# Patient Record
Sex: MALE | Employment: OTHER | URBAN - METROPOLITAN AREA
[De-identification: names, ages, dates, MRNs, and addresses within clinical notes are randomized per-mention and may not be internally consistent; named-entity substitution may affect disease eponyms.]

---

## 2017-03-01 ENCOUNTER — TELEPHONE (OUTPATIENT)
Dept: ONCOLOGY | Age: 62
End: 2017-03-01

## 2017-03-02 ENCOUNTER — TELEPHONE (OUTPATIENT)
Dept: ONCOLOGY | Age: 62
End: 2017-03-02

## 2017-03-02 NOTE — TELEPHONE ENCOUNTER
Please notify patient that labs and comments from Dr. Alexandru Chambers were released on [facility] on 2/7/17.

## 2017-03-02 NOTE — TELEPHONE ENCOUNTER
Patient is calling back a second time to obtain lab results. States that he has been waiting since 2/5/17 to get a call back with results. He would only like to speak with Dr. Hany Preciado regarding his labs.

## 2017-03-03 NOTE — TELEPHONE ENCOUNTER
Call placed to patient, HIPAA verified, made aware that lab results are available on [facility]. Patient requested printed labs be sent to his home, as he does not have access to [facility] at this time.  Labs placed in mail today per patient request.

## 2017-04-26 ENCOUNTER — HOSPITAL ENCOUNTER (EMERGENCY)
Age: 62
Discharge: HOME | End: 2017-04-26
Attending: EMERGENCY MEDICINE
Payer: MEDICARE

## 2017-04-26 ENCOUNTER — APPOINTMENT (OUTPATIENT)
Dept: GENERAL RADIOLOGY | Age: 62
End: 2017-04-26
Attending: EMERGENCY MEDICINE
Payer: MEDICARE

## 2017-04-26 VITALS
OXYGEN SATURATION: 99 % | TEMPERATURE: 97.9 F | HEART RATE: 82 BPM | DIASTOLIC BLOOD PRESSURE: 89 MMHG | RESPIRATION RATE: 15 BRPM | SYSTOLIC BLOOD PRESSURE: 149 MMHG

## 2017-04-26 DIAGNOSIS — S82.301A CLOSED FRACTURE OF DISTAL END OF RIGHT TIBIA, UNSPECIFIED FRACTURE MORPHOLOGY, INITIAL ENCOUNTER: Primary | ICD-10-CM

## 2017-04-26 PROCEDURE — 77030028224 HC PDNG CST BSNM -A

## 2017-04-26 PROCEDURE — 99284 EMERGENCY DEPT VISIT MOD MDM: CPT

## 2017-04-26 PROCEDURE — 73590 X-RAY EXAM OF LOWER LEG: CPT

## 2017-04-26 PROCEDURE — 73630 X-RAY EXAM OF FOOT: CPT

## 2017-04-26 PROCEDURE — 73610 X-RAY EXAM OF ANKLE: CPT

## 2017-04-26 PROCEDURE — 75810000053 HC SPLINT APPLICATION

## 2017-04-26 RX ORDER — HYDROCODONE BITARTRATE AND ACETAMINOPHEN 5; 325 MG/1; MG/1
1 TABLET ORAL
Qty: 17 TAB | Refills: 0 | Status: SHIPPED | OUTPATIENT
Start: 2017-04-26

## 2017-04-26 NOTE — ED PROVIDER NOTES
HPI Comments: 65-year-old male here with right ankle, foot and lower leg injury after a fall on Friday or 5 days ago. Patient was seen by his primary care physician and referred to the emergency room for further evaluation. Patient states that he was trying to push a spider out of the way with his foot when he slipped and fell while wearing socks on the floor. He has ecchymosis and swelling to his right ankle and foot as well as pain of his proximal right lower leg. He states that he struck around his left eye but denies any loss of consciousness, headache, nausea, vomiting, neck pain, weakness or numbness or any other complaints. He is able to weight-bear on the right ankle/foot but significant pain. Denies any other complaints. Social history: Nonsmoker. No alcohol or drug use. Past Medical History:   Diagnosis Date    Anxiety     Arthritis     Asthma     Burning with urination     Depression     Diabetes (HCC)     GERD (gastroesophageal reflux disease)     Memory loss     Psychiatric diagnosis     Generalized Anxiety Disorder    Psychiatric disorder     Anxiety    Psychiatric disorder     Depression       Past Surgical History:   Procedure Laterality Date    HX GI  4115    Fundoplication          Family History:   Problem Relation Age of Onset    Heart Disease Father     Dementia Father     Dementia Mother     Headache Mother     Migraines Mother     Neuropathy Mother        Social History     Social History    Marital status:      Spouse name: N/A    Number of children: N/A    Years of education: N/A     Occupational History    Not on file. Social History Main Topics    Smoking status: Never Smoker    Smokeless tobacco: Never Used    Alcohol use No      Comment: occasional    Drug use: No    Sexual activity: Not Currently     Other Topics Concern    Not on file     Social History Narrative    Lives with wife.          ALLERGIES: Review of patient's allergies indicates no known allergies. Review of Systems   Constitutional: Negative for fever. Musculoskeletal: Positive for arthralgias and gait problem. Negative for neck pain. All other systems reviewed and are negative. Vitals:    04/26/17 1648   BP: 149/89   Pulse: 99   Resp: 18   Temp: 100.3 °F (37.9 °C)   SpO2: 96%            Physical Exam   Constitutional: He is oriented to person, place, and time. He appears well-developed and well-nourished. HENT:   Head: Normocephalic. Mouth/Throat: Oropharynx is clear and moist. No oropharyngeal exudate. Mild ecchymosis around left eye.  + EOM   Eyes: Conjunctivae are normal. Pupils are equal, round, and reactive to light. Right eye exhibits no discharge. Left eye exhibits no discharge. No scleral icterus. Neck: Normal range of motion. Neck supple. Cardiovascular: Normal rate, regular rhythm and normal heart sounds. Exam reveals no gallop and no friction rub. No murmur heard. Pulmonary/Chest: Effort normal and breath sounds normal. No respiratory distress. He has no wheezes. He has no rales. Abdominal: Soft. Bowel sounds are normal. He exhibits no distension. There is no tenderness. There is no guarding. Musculoskeletal: Normal range of motion. Ecchymosis and tenderness of right ankle maleoli, medial foot, distal and proximal tibia. Distal pms of right leg intact. Lymphadenopathy:     He has no cervical adenopathy. Neurological: He is alert and oriented to person, place, and time. No cranial nerve deficit. He exhibits normal muscle tone. Coordination normal.   Skin: Skin is warm and dry. No rash noted. No pallor. Nursing note and vitals reviewed. [facility]  ED Course   right leg, ankle and foot injury occurring on Friday. NVI intact. Pain score of zero so will not treat pain at this time. Check right tib fib, ankle and foot x-ray.       Procedures      5:58 PM  Explained results of x-rays and treatment plan including need for splint, nonweight bearing, and close f/u with orthopedics. Explained that I will prescribe some pain medication and what kind. Pt questioned time frame to being discharged as \"[facility] even gives estimates for time\" and how long to place a splint. Pt stated he knew how healthcare was and wanted to a know a time frame. I estimated 45 minutes for him and his  after they demanded a more precise estimate to the time they will be leaving.      6:01 PM  Patient's results have been reviewed with them. Patient and/or family have verbally conveyed their understanding and agreement of the patient's signs, symptoms, diagnosis, treatment and prognosis and additionally agree to follow up as recommended or return to the Emergency Room should their condition change prior to follow-up. Discharge instructions have also been provided to the patient with some educational information regarding their diagnosis as well a list of reasons why they would want to return to the ER prior to their follow-up appointment should their condition change. Splint applied by staff and evaluated by Milli Brady MD.  Neurovascular status intact post splint application. Desired position maintained. 6:19 PM  Referred to [facility] at pt's request as pt does not want f/u with ortho [facility] as they do not return phone calls per him. Milli Brady MD          No results found for this or any previous visit (from the past 24 hour(s)). Xr Tib/fib Rt    Result Date: 4/26/2017  XR FOOT RT MIN 3 V, XR TIB/FIB RT, XR ANKLE RT MIN 3 V ,4/26/2017 5:18 PM INDICATION: Swollen right foot and ankle. Patient slid in a soccer while trying to put something off the floor last Friday. COMPARISON: None . FINDINGS: TIBIA and FIBULA: 2 views The proximal tibia and fibula are normal without evidence of fracture. [facility]  ANKLE: 3 views There is a fracture of the distal fibula with minimal posterior displacement of the distal fracture fragment by approximately 0.4 cm. [facility] FOOT: 3 views Visible fracture or malalignment in the foot. Mild degree degenerative change in the midfoot. [facility] IMPRESSION: 1. Fracture of the distal fibula with minimal displacement of the distal fragment posteriorly by approximately 0.4 cm       Xr Ankle Rt Min 3 V    Result Date: 4/26/2017  XR FOOT RT MIN 3 V, XR TIB/FIB RT, XR ANKLE RT MIN 3 V ,4/26/2017 5:18 PM INDICATION: Swollen right foot and ankle. Patient slid in a soccer while trying to put something off the floor last Friday. COMPARISON: None . FINDINGS: TIBIA and FIBULA: 2 views The proximal tibia and fibula are normal without evidence of fracture. [facility] ANKLE: 3 views There is a fracture of the distal fibula with minimal posterior displacement of the distal fracture fragment by approximately 0.4 cm. [facility] FOOT: 3 views Visible fracture or malalignment in the foot. Mild degree degenerative change in the midfoot. [facility] IMPRESSION: 1. Fracture of the distal fibula with minimal displacement of the distal fragment posteriorly by approximately 0.4 cm       Xr Foot Rt Min 3 V    Result Date: 4/26/2017  XR FOOT RT MIN 3 V, XR TIB/FIB RT, XR ANKLE RT MIN 3 V ,4/26/2017 5:18 PM INDICATION: Swollen right foot and ankle. Patient slid in a soccer while trying to put something off the floor last Friday. COMPARISON: None . FINDINGS: TIBIA and FIBULA: 2 views The proximal tibia and fibula are normal without evidence of fracture. [facility] ANKLE: 3 views There is a fracture of the distal fibula with minimal posterior displacement of the distal fracture fragment by approximately 0.4 cm. [facility] FOOT: 3 views Visible fracture or malalignment in the foot. Mild degree degenerative change in the midfoot. [facility] IMPRESSION: 1.  Fracture of the distal fibula with minimal displacement of the distal fragment posteriorly by approximately 0.4 cm

## 2017-04-26 NOTE — DISCHARGE INSTRUCTIONS
No weight bearing on the right leg    Use the crutches    Keep the splint in place at all times    Elevate the right leg    Take the pain medication that was prescribed    Take a stool softener while taking the pain medication    Call to schedule an appointment with the orthopedist for followup    Broken Lower Leg: Care Instructions  Your Care Instructions    Treatment for your broken leg will depend on how bad the break is. Your doctor may have put your lower leg in a splint or a cast to allow it to heal or keep it stable until you see another doctor. It may take weeks or months for your leg to heal. You can help it heal with some care at home. You heal best when you take good care of yourself. Eat a variety of healthy foods, and don't smoke. Follow-up care is a key part of your treatment and safety. Be sure to make and go to all appointments, and call your doctor if you are having problems. It's also a good idea to know your test results and keep a list of the medicines you take. How can you care for yourself at home? · Put ice or a cold pack on your lower leg for 10 to 20 minutes at a time. Try to do this every 1 to 2 hours for the next 3 days (when you are awake). Put a thin cloth between the ice and your cast or splint. Keep your cast or splint dry. · Follow the cast care instructions your doctor gives you. If you have a splint, do not take it off unless your doctor tells you to. · Be safe with medicines. Take pain medicines exactly as directed. ¨ If the doctor gave you a prescription medicine for pain, take it as prescribed. ¨ If you are not taking a prescription pain medicine, ask your doctor if you can take an over-the-counter medicine. · Do not put weight on your leg unless your doctor tells you to. Use crutches to walk. · Prop up your leg on pillows when you sit or lie down in the first few days after the injury. Keep your leg higher than the level of your heart.  This will help reduce swelling. · Follow instructions for exercises to keep your leg strong. · Wiggle your toes often to reduce swelling and stiffness. When should you call for help? Call 911 anytime you think you may need emergency care. For example, call if:  · You have sudden chest pain and shortness of breath, or you cough up blood. Call your doctor now or seek immediate medical care if:  · You have increased or severe pain. · Your foot is cool or pale or changes color. · You have tingling, weakness, or numbness in your toes. · Your cast or splint feels too tight. · You cannot move your toes. · You have signs of a blood clot, such as:  ¨ Pain in your calf, back of the knee, thigh, or groin. ¨ Redness and swelling in your leg or groin. · The skin under your cast or splint is burning or stinging. Watch closely for changes in your health, and be sure to contact your doctor if:  · You do not get better as expected. Where can you learn more? Go to http://gabriele-curly.info/. Enter L198 in the search box to learn more about \"Broken Lower Leg: Care Instructions. \"  Current as of: May 27, 2016  Content Version: 11.2  © 3496-1219 "[employer] Care instructions adapted under license by [employer] (which disclaims liability or warranty for this information). If you have questions about a medical condition or this instruction, always ask your healthcare professional. [place] [address] any warranty or liability for your use of this information. Learning About How to Use Crutches  Your Care Instructions  Crutches can help you walk when you have an injured hip, leg, knee, ankle, or foot. Your doctor will tell you how much weight--if any--you can put on your leg. Be sure your crutches fit you. When you stand up in your normal posture, there should be space for two or three fingers between the top of the crutch and your armpit.  When you let your hands hang down, the hand  should be at your wrists. When you put your hands on the hand , your elbows should be slightly bent. To stay safe when using crutches:  · Look straight ahead, not down at your feet. · Clear away small rugs, cords, or anything else that could cause you to trip, slip, or fall. · Be very careful around pets and small children. They can get in your path when you least expect it. · Be sure the rubber tips on your crutches are clean and in good condition to help prevent slipping. · Avoid slick conditions, such as wet floors and snowy or icy driveways. In bad weather, be especially careful on curbs and steps. How to use crutches  Getting ready to walk    1. Bend your elbows slightly. Press the padded top parts of the crutches against your sides, under your armpits. 2. If you have been told not to put any weight on your injured leg, keep that leg bent and off the ground. Walking with crutches    1. Put both crutches about 12 inches in front of you. 2. Put your weight on the handgrips, not on the pads under your arms. (Constant pressure against your underarms can cause numbness.) Swing your body forward. (If you have been told not to put any weight on your injured leg, keep that leg bent and off the ground.)  3. To complete the step, put your weight on the strong leg. 4. Move your crutches about 12 inches in front of you, and start the next step. 5. When you're confident using the crutches, you can move the crutches and your injured leg at the same time. Then push straight down on the crutches as you step past the crutches with your strong leg, as you would in normal walking. 6. Take small steps. 7. Use ramps and elevators when you can. Sitting down    1. To sit, back up to the chair. Use one hand to hold both crutches by the handgrips, beside your injured leg. With the other hand, hold onto the seat and slowly lower yourself onto the chair. 2. Lay the crutches on the ground near your chair.  If you prop them up, they may fall over. Getting up from a chair    1. To get up from a chair,  the crutches and put them in one hand beside your injured leg. 2. Put your weight on the handgrips of the crutches and on your strong leg to stand up. Walking up stairs    1. To go up stairs, step up with your strong leg and then bring the crutches and your injured leg to the upper step. 2. For stairs that have handrails: Put both crutches under the arm opposite the handrail. Use the hand opposite the handrail to hold both crutches by the handgrips. 3. Hold onto the handrail as you go up. Put your strong leg on the step first when you go up. Walking down stairs    1. To go down stairs, put your crutches and injured leg on the lower step. 2. Bring your strong leg to the lower step. This saying may help you remember: \"Up with the good, down with the bad. \"  3. For stairs that have handrails: Put both crutches under the arm opposite the handrail. Use the hand opposite the handrail to hold both crutches by the handgrips. Hold onto the handrail as you go down. Follow the same process you use for stairs: Lead with your crutches and injured leg on the way down. Follow-up care is a key part of your treatment and safety. Be sure to make and go to all appointments, and call your doctor if you are having problems. It's also a good idea to know your test results and keep a list of the medicines you take. Where can you learn more? Go to http://gabriele-curly.info/. Enter I241 in the search box to learn more about \"Learning About How to Use Crutches. \"  Current as of: August 4, 2016  Content Version: 11.2  © 3239-9177 [employer]. Care instructions adapted under license by [facility] (which disclaims liability or warranty for this information).  If you have questions about a medical condition or this instruction, always ask your healthcare professional. [facility] disclaims any warranty or liability for your use of this information. Caring for Your Splint: After Your Visit  Your Care Instructions     A splint protects a broken bone or other injury. If you have a removable splint, follow your doctor's instructions and only remove the splint if your doctor says you can. Most splints can be adjusted. Your doctor will show you how to do this and will tell you when you might need to adjust the splint. Many splints are premade. Your doctor may also make a splint from plaster or fiberglass. Some splints have a built-in air cushion. Air pads are inflated to hold the injured area in place. Follow-up care is a key part of your treatment and safety. Be sure to make and go to all appointments, and call your doctor if you are having problems. It's also a good idea to know your test results and keep a list of the medicines you take. How can you care for yourself at home? General care  · Don't put any weight on a splint. If you have a walking boot, your doctor will tell you when you can put weight on it. · If the fingers or toes on the limb with the splint were not injured, wiggle them every now and then. This helps move the blood and fluids in the injured limb. · Prop up the injured arm or leg on a pillow when you ice it or anytime you sit or lie down during the next 3 days. Try to keep it above the level of your heart. This will help reduce swelling. · Put ice or cold packs on the hurt area for 10 to 20 minutes at a time. Try to do this every 1 to 2 hours for the next 3 days (when you are awake) or until the swelling goes down. Be careful not to get the splint wet. · Be safe with medicines. Read and follow all instructions on the label. ¨ If the doctor gave you a prescription medicine for pain, take it as prescribed. ¨ If you are not taking a prescription pain medicine, ask your doctor if you can take an over-the-counter medicine.   · Keep up your muscle strength and tone as much as you can while protecting your injured limb or joint. Your doctor may want you to tense and relax the muscles protected by the splint. Check with your doctor or physical therapist for instructions. Splint and skin care  · If your splint is not to be removed, try blowing cool air from a hair dryer or fan into the splint to help relieve itching. Never stick items under your splint to scratch the skin. · Do not use oils or lotions near your splint. If the skin becomes red or sore around the edge of the splint, you may pad the edges with a soft material, such as moleskin, or use tape to cover the edges. · If you're allowed to take your splint off, be sure your skin is dry before you put it back on. · Watch for pressure sores. These can develop over bony areas. Symptoms include a warm spot under the cast, pain, drainage, or an odor. Call your doctor if you think you have a pressure sore. · Watch for compartment syndrome. This happens when pressure builds up in a group of muscles, nerves, and blood vessels. It is an emergency. Symptoms include severe pain or tingling or numbness. Water and your splint  · Keep your splint dry. Moisture can collect under the splint and cause skin irritation and itching. If you have a wound or have had surgery, moisture under the splint can increase the risk of infection. · Cover your splint with at least two layers of plastic when you take a shower or bath, unless your doctor said you can take it off while bathing. · If you can take the splint off when you bathe, pat the area dry after bathing and put the splint back on.  · If your splint gets a little wet, you can dry it with a hair dryer. Use a \"cool\" setting. When should you call for help? Call your doctor now or seek immediate medical care if:  · You have increased or severe pain. · You feel a warm or painful spot under the splint. · You have problems with your splint.  For example:  ¨ The skin under the splint burns or stings. ¨ The splint feels too tight. ¨ There is a lot of swelling near the splint. (Some swelling is normal.)  ¨ You have a new fever. ¨ There is drainage or a bad smell coming from the splint. · Your foot or hand is cool or pale or changes color. · You have trouble moving your fingers or toes. · You have symptoms of a blood clot in your arm or leg (called a deep vein thrombosis). These may include:  ¨ Pain in the arm, calf, back of the knee, thigh, or groin. ¨ Redness and swelling in the arm, leg, or groin. Watch closely for changes in your health, and be sure to contact your doctor if:  · The splint is breaking apart or losing its shape. · You are not getting better as expected. Where can you learn more? Go to [employer]  Enter M878 in the search box to learn more about \"Caring for Your Splint: After Your Visit. \"   © 7666-5690 [employer]. Care instructions adapted under license by [employer] (which disclaims liability or warranty for this information). This care instruction is for use with your licensed healthcare professional. If you have questions about a medical condition or this instruction, always ask your healthcare professional. [address] any warranty or liability for your use of this information. Content Version: 08.0.184396; Current as of: June 4, 2014             We hope that we have addressed all of your medical concerns. The examination and treatment you received in the Emergency Department were for an emergent problem and were not intended as complete care. It is important that you follow up with your healthcare provider(s) for ongoing care. If your symptoms worsen or do not improve as expected, and you are unable to reach your usual health care provider(s), you should return to the Emergency Department.       Today's healthcare is undergoing tremendous change, and patient satisfaction surveys are one of the many tools to assess the quality of medical care. You may receive a survey from the [employer] regarding your experience in the Emergency Department. I hope that your experience has been completely positive, particularly the medical care that I provided. As such, please participate in the survey; anything less than excellent does not meet my expectations or intentions. [facility] and [address] [place] [address] participate in nationally recognized quality of care measures. If your blood pressure is greater than 120/80, as reported below, we urge that you seek medical care to address the potential of high blood pressure, commonly known as hypertension. Hypertension can be hereditary or can be caused by certain medical conditions, pain, stress, or \"white coat syndrome. \"       Please make an appointment with your health care provider(s) for follow up of your Emergency Department visit. VITALS:   Patient Vitals for the past 8 hrs:   Temp Pulse Resp BP SpO2   04/26/17 1648 100.3 °F (37.9 °C) 99 18 149/89 96 %          Thank you for allowing us to provide you with medical care today. We realize that you have many choices for your emergency care needs. Please choose us in the future for any continued health care needs. Rashid Popli Brooks, [address]: [phone]            No results found for this or any previous visit (from the past 24 hour(s)). Xr Tib/fib Rt    Result Date: 4/26/2017  XR FOOT RT MIN 3 V, XR TIB/FIB RT, XR ANKLE RT MIN 3 V ,4/26/2017 5:18 PM INDICATION: Swollen right foot and ankle. Patient slid in a soccer while trying to put something off the floor last Friday. COMPARISON: None . FINDINGS: TIBIA and FIBULA: 2 views The proximal tibia and fibula are normal without evidence of fracture. Mariannae Pondomo  ANKLE: 3 views There is a fracture of the distal fibula with minimal posterior displacement of the distal fracture fragment by approximately 0.4 cm. Kleber Barthel FOOT: 3 views Visible fracture or malalignment in the foot. Mild degree degenerative change in the midfoot. Kleber Barthel IMPRESSION: 1. Fracture of the distal fibula with minimal displacement of the distal fragment posteriorly by approximately 0.4 cm       Xr Ankle Rt Min 3 V    Result Date: 4/26/2017  XR FOOT RT MIN 3 V, XR TIB/FIB RT, XR ANKLE RT MIN 3 V ,4/26/2017 5:18 PM INDICATION: Swollen right foot and ankle. Patient slid in a soccer while trying to put something off the floor last Friday. COMPARISON: None . FINDINGS: TIBIA and FIBULA: 2 views The proximal tibia and fibula are normal without evidence of fracture. Kleber Barthel ANKLE: 3 views There is a fracture of the distal fibula with minimal posterior displacement of the distal fracture fragment by approximately 0.4 cm. Kleber Barthel FOOT: 3 views Visible fracture or malalignment in the foot. Mild degree degenerative change in the midfoot. Kleber Barthel IMPRESSION: 1. Fracture of the distal fibula with minimal displacement of the distal fragment posteriorly by approximately 0.4 cm       Xr Foot Rt Min 3 V    Result Date: 4/26/2017  XR FOOT RT MIN 3 V, XR TIB/FIB RT, XR ANKLE RT MIN 3 V ,4/26/2017 5:18 PM INDICATION: Swollen right foot and ankle. Patient slid in a soccer while trying to put something off the floor last Friday. COMPARISON: None . FINDINGS: TIBIA and FIBULA: 2 views The proximal tibia and fibula are normal without evidence of fracture. Kleber Barthel ANKLE: 3 views There is a fracture of the distal fibula with minimal posterior displacement of the distal fracture fragment by approximately 0.4 cm. Kleber Barthel FOOT: 3 views Visible fracture or malalignment in the foot. Mild degree degenerative change in the midfoot. Kleber Barthel IMPRESSION: 1.  Fracture of the distal fibula with minimal displacement of the distal fragment posteriorly by approximately 0.4 cm

## 2017-04-26 NOTE — ED NOTES
Patient very talkative, stated that he understood how to use the crutches. Demonstrated use, but says he will probably use his rolator at home, so he does not bear weight on that right foot.  Still not c/o any pain, as long as he does not weight on the foot

## 2017-04-26 NOTE — ED TRIAGE NOTES
Patient arrived with a swollen right foot and ankle. Patient slid in a sock while trying to push something off the floor last Friday. The area is slightly swollen. His doctor referred him here for an xray.

## 2017-04-29 ENCOUNTER — HOSPITAL ENCOUNTER (EMERGENCY)
Age: 62
Discharge: HOME | End: 2017-04-29
Attending: EMERGENCY MEDICINE
Payer: MEDICARE

## 2017-04-29 ENCOUNTER — APPOINTMENT (OUTPATIENT)
Dept: GENERAL RADIOLOGY | Age: 62
End: 2017-04-29
Attending: EMERGENCY MEDICINE
Payer: MEDICARE

## 2017-04-29 VITALS
OXYGEN SATURATION: 97 % | HEIGHT: 67 IN | HEART RATE: 86 BPM | TEMPERATURE: 98.1 F | WEIGHT: 168 LBS | SYSTOLIC BLOOD PRESSURE: 140 MMHG | DIASTOLIC BLOOD PRESSURE: 82 MMHG | RESPIRATION RATE: 18 BRPM | BODY MASS INDEX: 26.37 KG/M2

## 2017-04-29 DIAGNOSIS — S82.891D CLOSED RIGHT ANKLE FRACTURE, WITH ROUTINE HEALING, SUBSEQUENT ENCOUNTER: Primary | ICD-10-CM

## 2017-04-29 PROCEDURE — 75810000053 HC SPLINT APPLICATION

## 2017-04-29 PROCEDURE — 77030028224 HC PDNG CST BSNM -A

## 2017-04-29 PROCEDURE — 99283 EMERGENCY DEPT VISIT LOW MDM: CPT

## 2017-04-29 PROCEDURE — 73610 X-RAY EXAM OF ANKLE: CPT

## 2017-04-29 NOTE — DISCHARGE INSTRUCTIONS
Broken Ankle: Care Instructions  Your Care Instructions    An ankle may break (fracture) during sports, a fall, or other accidents. Fractures can range from a small, hairline crack, to a bone or bones broken into two or more pieces. Your treatment depends on how bad the break is. Your doctor may have put your ankle in a splint or cast to allow it to heal or to keep it stable until you see another doctor. It may take weeks or months for your ankle to heal. You can help your ankle heal with some care at home. You heal best when you take good care of yourself. Eat a variety of healthy foods, and don't smoke. You may have had a sedative to help you relax. You may be unsteady after having sedation. It can take a few hours for the medicine's effects to wear off. Common side effects of sedation include nausea, vomiting, and feeling sleepy or tired. The doctor has checked you carefully, but problems can develop later. If you notice any problems or new symptoms, get medical treatment right away. Follow-up care is a key part of your treatment and safety. Be sure to make and go to all appointments, and call your doctor if you are having problems. It's also a good idea to know your test results and keep a list of the medicines you take. How can you care for yourself at home? · If the doctor gave you a sedative:  ¨ For 24 hours, don't do anything that requires attention to detail. It takes time for the medicine's effects to completely wear off. ¨ For your safety, do not drive or operate any machinery that could be dangerous. Wait until the medicine wears off and you can think clearly and react easily. · Put ice or a cold pack on your ankle for 10 to 20 minutes at a time. Try to do this every 1 to 2 hours for the next 3 days (when you are awake). Put a thin cloth between the ice and your cast or splint. Keep your cast or splint dry. · Follow the cast care instructions your doctor gives you.  If you have a splint, do not take it off unless your doctor tells you to. · Be safe with medicines. Take pain medicines exactly as directed. ¨ If the doctor gave you a prescription medicine for pain, take it as prescribed. ¨ If you are not taking a prescription pain medicine, ask your doctor if you can take an over-the-counter medicine. · Prop up your leg on pillows in the first few days after the injury. Keep the ankle higher than the level of your heart. This will help reduce swelling. · Do not put weight on your ankle unless your doctor tells you to. Use crutches to walk. · Follow instructions for exercises to keep your leg strong. · Wiggle your toes often to reduce swelling and stiffness. When should you call for help? Call 911 anytime you think you may need emergency care. For example, call if:  · You have trouble breathing. · You passed out (lost consciousness). · You have sudden chest pain and shortness of breath, or you cough up blood. Call your doctor now or seek immediate medical care if:  · You have new or worse nausea or vomiting. · You have increased or severe pain. · Your foot is cool or pale or changes color. · You have tingling, weakness, or numbness in your toes. · Your cast or splint feels too tight. · You cannot move your toes. · You have signs of a blood clot, such as:  ¨ Pain in your calf, back of the knee, thigh, or groin. ¨ Redness and swelling in your leg or groin. · The skin under your cast or splint is burning or stinging. Watch closely for changes in your health, and be sure to contact your doctor if:  · You do not get better as expected. Where can you learn more? Go to http://gabriele-curly.info/. Enter P763 in the search box to learn more about \"Broken Ankle: Care Instructions. \"  Current as of: May 23, 2016  Content Version: 11.2  © 5310-9408 [employer].  Care instructions adapted under license by [employer] (which disclaims liability or warranty for this information). If you have questions about a medical condition or this instruction, always ask your healthcare professional. [address] any warranty or liability for your use of this information. We hope that we have addressed all of your medical concerns. The examination and treatment you received in the Emergency Department were for an emergent problem and were not intended as complete care. It is important that you follow up with your healthcare provider(s) for ongoing care. If your symptoms worsen or do not improve as expected, and you are unable to reach your usual health care provider(s), you should return to the Emergency Department. Today's healthcare is undergoing tremendous change, and patient satisfaction surveys are one of the many tools to assess the quality of medical care. You may receive a survey from the [employer] regarding your experience in the Emergency Department. I hope that your experience has been completely positive, particularly the medical care that I provided. As such, please participate in the survey; anything less than excellent does not meet my expectations or intentions. [facility] and [facility] participate in nationally recognized quality of care measures. If your blood pressure is greater than 120/80, as reported below, we urge that you seek medical care to address the potential of high blood pressure, commonly known as hypertension. Hypertension can be hereditary or can be caused by certain medical conditions, pain, stress, or \"white coat syndrome. \"       Please make an appointment with your health care provider(s) for follow up of your Emergency Department visit. VITALS:   Patient Vitals for the past 8 hrs:   Temp Pulse Resp BP SpO2   04/29/17 1059 98.1 °F (36.7 °C) 86 18 140/82 97 %          Thank you for allowing us to provide you with medical care today.   We realize that you have many choices for your emergency care needs. Please choose us in the future for any continued health care needs. Shakira Khalil [facility], [address]: [phone]            No results found for this or any previous visit (from the past 24 hour(s)). Xr Ankle Rt Min 3 V    Result Date: 4/29/2017  EXAM:  XR ANKLE RT MIN 3 V INDICATION:  worsening pain at fracture. COMPARISON: 4/26/2017. FINDINGS: Three views of the right ankle demonstrate no change. There is oblique fracture through the distal fibular shaft with slight displacement and overriding. There is no widening of the mortise. There is no new fracture. There is soft tissue swelling. IMPRESSION: Stable distal fibular fracture. No new findings.

## 2017-04-29 NOTE — ED PROVIDER NOTES
Patient is a 57 y.o. male presenting with ankle pain. Ankle Pain       58 yo WM presents with ankle pain. Pt broke his ankle about 1 week ago, was seen in ED. Followed up with ortho and had splint remade. Pt states he is having worsening pain and does not feel the splint is on correctly. No pain at rest. Pt has been using crutches but patient also states he has been putting weight on his leg. He states he was told by ortho not to put weight on the leg. Denies color change, weakness or numbness. Not taking anything for pain at home. Past Medical History:   Diagnosis Date    Anxiety     Arthritis     Asthma     Burning with urination     Depression     Diabetes (HCC)     GERD (gastroesophageal reflux disease)     Memory loss     Psychiatric diagnosis     Generalized Anxiety Disorder    Psychiatric disorder     Anxiety    Psychiatric disorder     Depression       Past Surgical History:   Procedure Laterality Date    HX GI  7002    Fundoplication          Family History:   Problem Relation Age of Onset    Heart Disease Father     Dementia Father     Dementia Mother     Headache Mother     Migraines Mother     Neuropathy Mother        Social History     Social History    Marital status:      Spouse name: N/A    Number of children: N/A    Years of education: N/A     Occupational History    Not on file. Social History Main Topics    Smoking status: Never Smoker    Smokeless tobacco: Never Used    Alcohol use No      Comment: occasional    Drug use: No    Sexual activity: Not Currently     Other Topics Concern    Not on file     Social History Narrative    Lives with wife. ALLERGIES: Review of patient's allergies indicates no known allergies. Review of Systems   Constitutional: Negative for chills and fever. Respiratory: Negative for shortness of breath. Cardiovascular: Positive for leg swelling. Negative for chest pain.    Musculoskeletal: Positive for arthralgias, gait problem and myalgias. Skin: Positive for color change. Negative for pallor, rash and wound. All other systems reviewed and are negative. Vitals:    04/29/17 1059   BP: 140/82   Pulse: 86   Resp: 18   Temp: 98.1 °F (36.7 °C)   SpO2: 97%   Weight: 76.2 kg (168 lb)   Height: 5' 7\" (1.702 m)            Physical Exam   Physical Examination: General appearance - alert, well appearing, and in no distress, oriented to person, place, and time and normal appearing weight  Eyes - pupils equal and reactive, extraocular eye movements intact  Neck - supple, no significant adenopathy  Chest - clear to auscultation, no wheezes, rales or rhonchi, symmetric air entry  Heart - normal rate, regular rhythm, normal S1, S2, no murmurs, rubs, clicks or gallops  Abdomen - soft, nontender, nondistended, no masses or organomegaly  Back exam - full range of motion, no tenderness, palpable spasm or pain on motion  Neurological - alert, oriented, normal speech, no focal findings or movement disorder noted  Musculoskeletal - splint in place to right lower leg, NVI intact  Extremities - peripheral pulses normal, no pedal edema, no clubbing or cyanosis  Skin - normal coloration and turgor, no rashes, no suspicious skin lesions noted  MDM  Number of Diagnoses or Management Options  Closed right ankle fracture, with routine healing, subsequent encounter:      Amount and/or Complexity of Data Reviewed  Tests in the radiology section of CPT®: ordered and reviewed  Decide to obtain previous medical records or to obtain history from someone other than the patient: yes  Obtain history from someone other than the patient: yes (caregiver)  Review and summarize past medical records: yes  Independent visualization of images, tracings, or specimens: yes      ED Course       Procedures  Splint removed for exam. Swelling and bruising to right lower leg, NVI intact with cap refill <2 sec, sensation intact, no wounds.     Splint reapplied by nursing. Pt NVI after splint placement with good placement. Will f/u with ortho as scheduled for surgery this week. Return to ED for worsening symptoms. Caregiver at bedside.

## 2017-04-29 NOTE — ED TRIAGE NOTES
Pt presents to ED with request for splint replacement. Pt was seen here earlier this week. Pt was seen at [address] [place] [address] and placed in different splint. Pt states he is having much worse pain and the foot does not feel like it is immobilized correctly.

## 2017-05-01 DIAGNOSIS — R59.1 LYMPHADENOPATHY: Primary | ICD-10-CM

## 2017-05-02 ENCOUNTER — ANESTHESIA EVENT (OUTPATIENT)
Dept: SURGERY | Age: 62
End: 2017-05-02
Payer: MEDICARE

## 2017-05-02 NOTE — PERIOP NOTES
The patient called PAT this morning to see if we received his cardiac clearance. Informed him that no clearance was received as of yet. Instructed the patient to call Dr. Mimi Logan office to inquire about this clearance since it would be coming to his office first.  Left a VM for uPra Dodd at Dr. Mimi Logan office inquiring about this and requested that he call the patient to discuss this. DOS: 5/3/2017.

## 2017-05-02 NOTE — PERIOP NOTES
Stanley Ramirez from Dr. Adenike Fernandez office returned my call. Per his records, the patient did not need cardiac clearance. DOS: 5/3/2017.

## 2017-05-03 ENCOUNTER — APPOINTMENT (OUTPATIENT)
Dept: GENERAL RADIOLOGY | Age: 62
End: 2017-05-03
Attending: ORTHOPAEDIC SURGERY
Payer: MEDICARE

## 2017-05-03 ENCOUNTER — ANESTHESIA (OUTPATIENT)
Dept: SURGERY | Age: 62
End: 2017-05-03
Payer: MEDICARE

## 2017-05-03 ENCOUNTER — HOSPITAL ENCOUNTER (OUTPATIENT)
Age: 62
Setting detail: OUTPATIENT SURGERY
Discharge: HOME | End: 2017-05-03
Attending: ORTHOPAEDIC SURGERY | Admitting: ORTHOPAEDIC SURGERY
Payer: MEDICARE

## 2017-05-03 VITALS
HEART RATE: 75 BPM | SYSTOLIC BLOOD PRESSURE: 155 MMHG | HEIGHT: 67 IN | WEIGHT: 184.53 LBS | RESPIRATION RATE: 20 BRPM | TEMPERATURE: 97.7 F | OXYGEN SATURATION: 99 % | DIASTOLIC BLOOD PRESSURE: 88 MMHG | BODY MASS INDEX: 28.96 KG/M2

## 2017-05-03 PROCEDURE — 76060000033 HC ANESTHESIA 1 TO 1.5 HR: Performed by: ORTHOPAEDIC SURGERY

## 2017-05-03 PROCEDURE — C1713 ANCHOR/SCREW BN/BN,TIS/BN: HCPCS | Performed by: ORTHOPAEDIC SURGERY

## 2017-05-03 PROCEDURE — 74011000250 HC RX REV CODE- 250

## 2017-05-03 PROCEDURE — 76210000021 HC REC RM PH II 0.5 TO 1 HR: Performed by: ORTHOPAEDIC SURGERY

## 2017-05-03 PROCEDURE — 74011250636 HC RX REV CODE- 250/636

## 2017-05-03 PROCEDURE — 77030002933 HC SUT MCRYL J&J -A: Performed by: ORTHOPAEDIC SURGERY

## 2017-05-03 PROCEDURE — 74011250636 HC RX REV CODE- 250/636: Performed by: ORTHOPAEDIC SURGERY

## 2017-05-03 PROCEDURE — 77030018836 HC SOL IRR NACL ICUM -A: Performed by: ORTHOPAEDIC SURGERY

## 2017-05-03 PROCEDURE — 76000 FLUOROSCOPY <1 HR PHYS/QHP: CPT

## 2017-05-03 PROCEDURE — 77030008467 HC STPLR SKN COVD -B: Performed by: ORTHOPAEDIC SURGERY

## 2017-05-03 PROCEDURE — 76210000006 HC OR PH I REC 0.5 TO 1 HR: Performed by: ORTHOPAEDIC SURGERY

## 2017-05-03 PROCEDURE — 77030003601 HC NDL NRV BLK BBMI -A

## 2017-05-03 PROCEDURE — 76010000149 HC OR TIME 1 TO 1.5 HR: Performed by: ORTHOPAEDIC SURGERY

## 2017-05-03 PROCEDURE — 77030003862 HC BIT DRL SYNT -B: Performed by: ORTHOPAEDIC SURGERY

## 2017-05-03 PROCEDURE — 77030011640 HC PAD GRND REM COVD -A: Performed by: ORTHOPAEDIC SURGERY

## 2017-05-03 PROCEDURE — 77030020782 HC GWN BAIR PAWS FLX 3M -B

## 2017-05-03 PROCEDURE — 77030020143 HC AIRWY LARYN INTUB CGAS -A: Performed by: ANESTHESIOLOGY

## 2017-05-03 PROCEDURE — 77030013570 HC DRSG BURN DERY -A: Performed by: ORTHOPAEDIC SURGERY

## 2017-05-03 PROCEDURE — 74011250637 HC RX REV CODE- 250/637: Performed by: ORTHOPAEDIC SURGERY

## 2017-05-03 PROCEDURE — 77030008847 HC WRE K SYNT -A: Performed by: ORTHOPAEDIC SURGERY

## 2017-05-03 PROCEDURE — 74011250636 HC RX REV CODE- 250/636: Performed by: ANESTHESIOLOGY

## 2017-05-03 PROCEDURE — 64445 NJX AA&/STRD SCIATIC NRV IMG: CPT

## 2017-05-03 DEVICE — 3.5MM CORTEX SCREW SELF-TAPPING 14MM: Type: IMPLANTABLE DEVICE | Site: ANKLE | Status: FUNCTIONAL

## 2017-05-03 DEVICE — SCREW BNE L14MM DIA4MM CANC S STL ST CANN NONLOCKING FULL: Type: IMPLANTABLE DEVICE | Site: ANKLE | Status: FUNCTIONAL

## 2017-05-03 DEVICE — 3.5MM CORTEX SCREW SELF-TAPPING 18MM: Type: IMPLANTABLE DEVICE | Site: ANKLE | Status: FUNCTIONAL

## 2017-05-03 DEVICE — 3.5MM CORTEX SCREW SELF-TAPPING 12MM: Type: IMPLANTABLE DEVICE | Site: ANKLE | Status: FUNCTIONAL

## 2017-05-03 DEVICE — SCREW BNE L16MM DIA3.5MM CORT S STL ST NONCANNULATED LOK: Type: IMPLANTABLE DEVICE | Site: ANKLE | Status: FUNCTIONAL

## 2017-05-03 DEVICE — PLATE BNE L61MM 5 H S STL 1/3 TBLR W/ CLLR LIMIT CNTCT DYN: Type: IMPLANTABLE DEVICE | Site: ANKLE | Status: FUNCTIONAL

## 2017-05-03 RX ORDER — SODIUM CHLORIDE, SODIUM LACTATE, POTASSIUM CHLORIDE, CALCIUM CHLORIDE 600; 310; 30; 20 MG/100ML; MG/100ML; MG/100ML; MG/100ML
125 INJECTION, SOLUTION INTRAVENOUS CONTINUOUS
Status: DISCONTINUED | OUTPATIENT
Start: 2017-05-03 | End: 2017-05-03 | Stop reason: HOSPADM

## 2017-05-03 RX ORDER — CEFAZOLIN SODIUM IN 0.9 % NACL 2 G/50 ML
2 INTRAVENOUS SOLUTION, PIGGYBACK (ML) INTRAVENOUS ONCE
Status: COMPLETED | OUTPATIENT
Start: 2017-05-03 | End: 2017-05-03

## 2017-05-03 RX ORDER — OXYCODONE AND ACETAMINOPHEN 5; 325 MG/1; MG/1
2 TABLET ORAL
Status: COMPLETED | OUTPATIENT
Start: 2017-05-03 | End: 2017-05-03

## 2017-05-03 RX ORDER — SODIUM CHLORIDE 0.9 % (FLUSH) 0.9 %
5-10 SYRINGE (ML) INJECTION AS NEEDED
Status: DISCONTINUED | OUTPATIENT
Start: 2017-05-03 | End: 2017-05-03 | Stop reason: HOSPADM

## 2017-05-03 RX ORDER — PROPOFOL 10 MG/ML
INJECTION, EMULSION INTRAVENOUS AS NEEDED
Status: DISCONTINUED | OUTPATIENT
Start: 2017-05-03 | End: 2017-05-03 | Stop reason: HOSPADM

## 2017-05-03 RX ORDER — FENTANYL CITRATE 50 UG/ML
INJECTION, SOLUTION INTRAMUSCULAR; INTRAVENOUS AS NEEDED
Status: DISCONTINUED | OUTPATIENT
Start: 2017-05-03 | End: 2017-05-03 | Stop reason: HOSPADM

## 2017-05-03 RX ORDER — DIPHENHYDRAMINE HYDROCHLORIDE 50 MG/ML
12.5 INJECTION, SOLUTION INTRAMUSCULAR; INTRAVENOUS AS NEEDED
Status: DISCONTINUED | OUTPATIENT
Start: 2017-05-03 | End: 2017-05-03 | Stop reason: HOSPADM

## 2017-05-03 RX ORDER — ROPIVACAINE HYDROCHLORIDE 5 MG/ML
INJECTION, SOLUTION EPIDURAL; INFILTRATION; PERINEURAL AS NEEDED
Status: DISCONTINUED | OUTPATIENT
Start: 2017-05-03 | End: 2017-05-03 | Stop reason: HOSPADM

## 2017-05-03 RX ORDER — ONDANSETRON 2 MG/ML
4 INJECTION INTRAMUSCULAR; INTRAVENOUS AS NEEDED
Status: DISCONTINUED | OUTPATIENT
Start: 2017-05-03 | End: 2017-05-03 | Stop reason: HOSPADM

## 2017-05-03 RX ORDER — LIDOCAINE HYDROCHLORIDE 20 MG/ML
INJECTION, SOLUTION INFILTRATION; PERINEURAL AS NEEDED
Status: DISCONTINUED | OUTPATIENT
Start: 2017-05-03 | End: 2017-05-03 | Stop reason: HOSPADM

## 2017-05-03 RX ORDER — LIDOCAINE HYDROCHLORIDE 10 MG/ML
0.1 INJECTION, SOLUTION EPIDURAL; INFILTRATION; INTRACAUDAL; PERINEURAL AS NEEDED
Status: DISCONTINUED | OUTPATIENT
Start: 2017-05-03 | End: 2017-05-03 | Stop reason: HOSPADM

## 2017-05-03 RX ORDER — ONDANSETRON 2 MG/ML
INJECTION INTRAMUSCULAR; INTRAVENOUS AS NEEDED
Status: DISCONTINUED | OUTPATIENT
Start: 2017-05-03 | End: 2017-05-03 | Stop reason: HOSPADM

## 2017-05-03 RX ORDER — SODIUM CHLORIDE 0.9 % (FLUSH) 0.9 %
5-10 SYRINGE (ML) INJECTION EVERY 8 HOURS
Status: DISCONTINUED | OUTPATIENT
Start: 2017-05-03 | End: 2017-05-03 | Stop reason: HOSPADM

## 2017-05-03 RX ORDER — SODIUM CHLORIDE, SODIUM LACTATE, POTASSIUM CHLORIDE, CALCIUM CHLORIDE 600; 310; 30; 20 MG/100ML; MG/100ML; MG/100ML; MG/100ML
100 INJECTION, SOLUTION INTRAVENOUS CONTINUOUS
Status: DISCONTINUED | OUTPATIENT
Start: 2017-05-03 | End: 2017-05-03 | Stop reason: HOSPADM

## 2017-05-03 RX ORDER — MIDAZOLAM HYDROCHLORIDE 1 MG/ML
INJECTION, SOLUTION INTRAMUSCULAR; INTRAVENOUS AS NEEDED
Status: DISCONTINUED | OUTPATIENT
Start: 2017-05-03 | End: 2017-05-03 | Stop reason: HOSPADM

## 2017-05-03 RX ORDER — HYDROMORPHONE HYDROCHLORIDE 1 MG/ML
.25-1 INJECTION, SOLUTION INTRAMUSCULAR; INTRAVENOUS; SUBCUTANEOUS
Status: DISCONTINUED | OUTPATIENT
Start: 2017-05-03 | End: 2017-05-03 | Stop reason: HOSPADM

## 2017-05-03 RX ADMIN — MIDAZOLAM HYDROCHLORIDE 2 MG: 1 INJECTION, SOLUTION INTRAMUSCULAR; INTRAVENOUS at 13:35

## 2017-05-03 RX ADMIN — SODIUM CHLORIDE, POTASSIUM CHLORIDE, SODIUM LACTATE AND CALCIUM CHLORIDE: 600; 310; 30; 20 INJECTION, SOLUTION INTRAVENOUS at 14:35

## 2017-05-03 RX ADMIN — PROPOFOL 160 MG: 10 INJECTION, EMULSION INTRAVENOUS at 13:53

## 2017-05-03 RX ADMIN — MIDAZOLAM HYDROCHLORIDE 1 MG: 1 INJECTION, SOLUTION INTRAMUSCULAR; INTRAVENOUS at 13:36

## 2017-05-03 RX ADMIN — SODIUM CHLORIDE, POTASSIUM CHLORIDE, SODIUM LACTATE AND CALCIUM CHLORIDE: 600; 310; 30; 20 INJECTION, SOLUTION INTRAVENOUS at 13:40

## 2017-05-03 RX ADMIN — ONDANSETRON 4 MG: 2 INJECTION INTRAMUSCULAR; INTRAVENOUS at 14:32

## 2017-05-03 RX ADMIN — LIDOCAINE HYDROCHLORIDE 32 MG: 20 INJECTION, SOLUTION INFILTRATION; PERINEURAL at 13:53

## 2017-05-03 RX ADMIN — FENTANYL CITRATE 50 MCG: 50 INJECTION, SOLUTION INTRAMUSCULAR; INTRAVENOUS at 13:35

## 2017-05-03 RX ADMIN — MIDAZOLAM HYDROCHLORIDE 1 MG: 1 INJECTION, SOLUTION INTRAMUSCULAR; INTRAVENOUS at 13:49

## 2017-05-03 RX ADMIN — ROPIVACAINE HYDROCHLORIDE 30 ML: 5 INJECTION, SOLUTION EPIDURAL; INFILTRATION; PERINEURAL at 13:44

## 2017-05-03 RX ADMIN — MIDAZOLAM HYDROCHLORIDE 1 MG: 1 INJECTION, SOLUTION INTRAMUSCULAR; INTRAVENOUS at 13:39

## 2017-05-03 RX ADMIN — OXYCODONE HYDROCHLORIDE AND ACETAMINOPHEN 2 TABLET: 5; 325 TABLET ORAL at 16:08

## 2017-05-03 RX ADMIN — CEFAZOLIN 2 G: 1 INJECTION, POWDER, FOR SOLUTION INTRAMUSCULAR; INTRAVENOUS; PARENTERAL at 13:57

## 2017-05-03 RX ADMIN — FENTANYL CITRATE 50 MCG: 50 INJECTION, SOLUTION INTRAMUSCULAR; INTRAVENOUS at 13:49

## 2017-05-03 NOTE — BRIEF OP NOTE
BRIEF OPERATIVE NOTE    Date of Procedure: 5/3/2017   Preoperative Diagnosis: RIGHT ANKLE FRACTURE   Postoperative Diagnosis: RIGHT ANKLE FRACTURE     Procedure(s):  OPEN REDUCTION INTERNAL FIXATION RIGHT ANKLE (GEN/POP BLK)  Surgeon(s) and Role:     * Emanuel Langford MD - Primary         Assistant Staff:       Surgical Staff:  Circ-1: Jesika Garrison RN  Scrub Tech-1: Early Elms. Alva  Surg Asst-1: Christina Contreras  Float Staff: Dillan Goff RN  Radiology Technologist: Yeyo Romero RT, R  Event Time In   Incision Start 1406   Incision Close 1443     Anesthesia: General   Estimated Blood Loss: 0  Specimens: * No specimens in log *   Findings: 0   Complications: 0  Implants:   Implant Name Type Inv.  Item Serial No.  Lot No. LRB No. Used Action   PLATE 1/3 TUBLR CLLR 5H 61MM --  - SNA  PLATE 1/3 TUBLR CLLR 5H 61MM --  NA SYNTHES [place] NA Right 1 Implanted   SCR BNE CRTX ST HEX 3.5X14MM -- SS - SNA  SCR BNE CRTX ST HEX 3.5X14MM -- SS NA SYNTHES [place] NA Right 1 Implanted   SCR BNE CRTX ST HEX 3.5X12MM -- SS - SNA  SCR BNE CRTX ST HEX 3.5X12MM -- SS NA SYNTHES [place] NA Right 1 Implanted   SCR BNE CRTX ST HEX 3.5X16MM -- SS - SNA  SCR BNE CRTX ST HEX 3.5X16MM -- SS NA SYNTHES [place] NA Right 1 Implanted   SCR BNE CANC FT 4X14MM SS --  - SNA  SCR BNE CANC FT 4X14MM SS --  NA SYNTHES [place] NA Right 1 Implanted   SCR BNE CRTX ST HEX 3.5X18MM -- SS - SNA   SCR BNE CRTX ST HEX 3.5X18MM -- SS NA SYNTHES [place] NA Right 1 Implanted

## 2017-05-03 NOTE — ANESTHESIA PREPROCEDURE EVALUATION
Anesthetic History   No history of anesthetic complications            Review of Systems / Medical History  Patient summary reviewed and pertinent labs reviewed    Pulmonary        Sleep apnea: No treatment           Neuro/Psych         Psychiatric history    Comments: Anxiety/Depression Cardiovascular  Within defined limits                Exercise tolerance: >4 METS     GI/Hepatic/Renal             Pertinent negatives: No GERD   Endo/Other        Arthritis    Comments: Pre-Diabetes Mellitus  Other Findings              Physical Exam    Airway  Mallampati: III  TM Distance: 4 - 6 cm  Neck ROM: normal range of motion   Mouth opening: Normal     Cardiovascular    Rhythm: regular  Rate: normal         Dental  No notable dental hx       Pulmonary  Breath sounds clear to auscultation               Abdominal         Other Findings            Anesthetic Plan    ASA: 2  Anesthesia type: general      Post-op pain plan if not by surgeon: peripheral nerve block single      Anesthetic plan and risks discussed with: Patient

## 2017-05-03 NOTE — IP AVS SNAPSHOT
[address] [facility] [address] [facility] 
[phone] Patient: Miam Lafleur 
MRN: [id number] TFN:[id number] You are allergic to the following No active allergies Recent Documentation Height Weight BMI Smoking Status 1.708 m 83.7 kg 28.69 kg/m2 Never Smoker Emergency Contacts Name Discharge Info Relation Home Work Mobile [facility] DISCHARGE CAREGIVER [3] Spouse [3] [phone] Nba Pereira DISCHARGE CAREGIVER [3] Friend [5]   [phone] About your hospitalization You were admitted on:  May 3, 2017 You last received care in the:  [facility] PACU You were discharged on:  May 3, 2017 Unit phone number:  [phone] Why you were hospitalized Your primary diagnosis was:  Not on File Providers Seen During Your Hospitalizations Provider Role Specialty Primary office phone Enrique Talavera MD Attending Provider Orthopedic Surgery [phone] Your Primary Care Physician (PCP) Primary Care Physician Office Phone Office Fax Aida Quispe [phone] Follow-up Information Follow up With Details Comments Contact Info Pradeep Donovan MD   [address] [facility] 
[phone] Current Discharge Medication List  
  
CONTINUE these medications which have NOT CHANGED Dose & Instructions Dispensing Information Comments Morning Noon Evening Bedtime ALVESCO 160 mcg/actuation Hfaa Generic drug:  Ciclesonide Your last dose was: Your next dose is: Take  by inhalation. Refills:  0  
     
   
   
   
  
 ARICEPT 10 mg tablet Generic drug:  donepezil Your last dose was: Your next dose is:    
   
   
 Dose:  10 mg Take 10 mg by mouth nightly. Refills:  0 BENTYL 20 mg tablet Generic drug:  dicyclomine Your last dose was: Your next dose is:    
   
   
 Dose:  20 mg Take 20 mg by mouth four (4) times daily. Refills:  0 CeleBREX 200 mg capsule Generic drug:  celecoxib Your last dose was: Your next dose is:    
   
   
 Dose:  200 mg Take 200 mg by mouth two (2) times a day. Refills:  0 CLARINEX 5 mg tablet Generic drug:  desloratadine Your last dose was: Your next dose is:    
   
   
 Dose:  5 mg Take 5 mg by mouth daily. Refills:  0  
     
   
   
   
  
 fludrocortisone 0.1 mg tablet Commonly known as:  FLORINEF Your last dose was: Your next dose is:    
   
   
 Dose:  0.1 mg Take 0.1 mg by mouth daily. Refills:  0 HYDROcodone-acetaminophen 5-325 mg per tablet Commonly known as:  Simmie Darrianer Your last dose was: Your next dose is:    
   
   
 Dose:  1 Tab Take 1 Tab by mouth every six (6) hours as needed for Pain. Max Daily Amount: 4 Tabs. Quantity:  17 Tab Refills:  0  
     
   
   
   
  
 meloxicam 15 mg tablet Commonly known as:  MOBIC Your last dose was: Your next dose is:    
   
   
 Dose:  15 mg Take 15 mg by mouth daily. Refills:  0 NexIUM 40 mg capsule Generic drug:  esomeprazole Your last dose was: Your next dose is:    
   
   
 Dose:  40 mg Take 40 mg by mouth daily. Refills:  0  
     
   
   
   
  
 OTHER Your last dose was: Your next dose is:    
   
   
 Takes keflex and does not know dose Refills:  0 REGLAN 5 mg tablet Generic drug:  metoclopramide HCl Your last dose was: Your next dose is:    
   
   
 Dose:  5 mg Take 5 mg by mouth four (4) times daily. Refills:  0  
     
   
   
   
  
 REQUIP 1 mg tablet Generic drug:  rOPINIRole Your last dose was: Your next dose is: Dose:  4 mg Take 4 mg by mouth nightly. Refills:  0  
     
   
   
   
  
 rosuvastatin 20 mg tablet Commonly known as:  CRESTOR Your last dose was: Your next dose is:    
   
   
 Dose:  20 mg Take 20 mg by mouth nightly. Refills:  0 SEROquel 100 mg tablet Generic drug:  QUEtiapine Your last dose was: Your next dose is:    
   
   
 Dose:  200 mg Take 200 mg by mouth nightly. Refills:  0  
     
   
   
   
  
 traZODone 100 mg tablet Commonly known as:  Arneta Messenger Your last dose was: Your next dose is:    
   
   
 Dose:  450 mg Take 450 mg by mouth nightly. Refills:  0 WELLBUTRIN  mg SR tablet Generic drug:  buPROPion SR Your last dose was: Your next dose is:    
   
   
 Dose:  150 mg Take 150 mg by mouth three (3) times daily. Refills:  0  
     
   
   
   
  
 XANAX 2 mg tablet Generic drug:  ALPRAZolam  
   
Your last dose was: Your next dose is:    
   
   
 Dose:  1 mg Take 1 mg by mouth three (3) times daily. Refills:  0 Discharge Instructions See Dr. Ben Arora pre-printed discharge instructions. DISCHARGE SUMMARY from your Nurse The following personal items collected during your admission are returned to you:  
Dental Appliance: Dental Appliances: None Vision: Visual Aid: Glasses Hearing Aid:   
Jewelry: Jewelry: None Clothing: Clothing: Other (comment) (street clothes to locker) Other Valuables: Other Valuables: None Valuables sent to safe:   
 
PATIENT INSTRUCTIONS: 
 
After general anesthesia or intravenous sedation, for 24 hours or while taking prescription Narcotics: · Limit your activities · Do not drive and operate hazardous machinery · Do not make important personal or business decisions · Do  not drink alcoholic beverages · If you have not urinated within 8 hours after discharge, please contact your surgeon on call. Report the following to your surgeon: 
· Excessive pain, swelling, redness or odor of or around the surgical area · Temperature over 100.5 · Nausea and vomiting lasting longer than 4 hours or if unable to take medications · Any signs of decreased circulation or nerve impairment to extremity: change in color, persistent  numbness, tingling, coldness or increase pain · Any questions [address] Breathing deep and coughing are very important exercises to do after surgery. Deep breathing and coughing open the little air tubes and air sacks in your lungs. You take deep breaths every day. You may not even notice - it is just something you do when you sigh or yawn. It is a natural exercise you do to keep these air passages open. After surgery, take deep breaths and cough, on purpose. Coughing and deep breathing help prevent bronchitis and pneumonia after surgery. If you had chest or belly surgery, use a pillow as a \"hug buddy\" and hold it tightly to your chest or belly when you cough. DIRECTIONS: 
6. Take 10 to 15 slow deep breaths every hour while awake. 7. Breathe in deeply, and hold it for 2 seconds. 8. Exhale slowly through puckered lips, like blowing up a balloon. 9. After every 4th or 5th deep breath, hug your pillow to your chest or belly and give a hard, deep cough. Yes, it will probably hurt. But doing this exercise is very important part of healing after surgery. Take your pain medicine to help you do this exercise without too much pain. IF YOU HAVE BEEN DIAGNOSED WITH SLEEP APNEA, PLEASE USE YOUR SLEEIFP APNEA DEVICE OR CPAP MACHINE WHEN YOU INTEND TO NAP AFTER TAKING PAIN MEDICATION. Ankle Pumps Ankle pumps increase the circulation of oxygenated blood to your lower extremities and decrease your risk for circulation problems such as blood clots. They also stretch the muscles, tendons and ligaments in your foot and ankle, and prevent joint contracture in the ankle and foot, especially after surgeries on the legs. It is important to do ankle pump exercises regularly after surgery because immobility increases your risk for developing a blood clot. Your doctor may also have you take an Aspirin for the next few days as well. If your doctor did not ask you to take an Aspirin, consult with him before starting Aspirin therapy on your own. Slowly point your foot forward, feeling the muscles on the top of your lower leg stretch, and hold this position for 5 seconds. Next, pull your foot back toward you as far as possible, stretching the calf muscles, and hold that position for 5 seconds. Repeat with the other foot. Perform 10 repetitions every hour while awake for both ankles if possible (down and then up with the foot once is one repetition). You should feel gentle stretching of the muscles in your lower leg when doing this exercise. If you feel pain, or your range of motion is limited, don't  Push too hard. Only go the limit your joint and muscles will let you go. If you have increasing pain, progressively worsening leg warmth or swelling, STOP the exercise and call your doctor. Below is information about the medications your doctor is prescribing after your visit: 
 
 
· oxycodone Discharge Orders None Introducing [facility]! Dear Joni العلي: Thank you for requesting a [employer] account. Our records indicate that you already have an active [email or web address] account. You can access your account anytime at [email or web address] Did you know that you can access your hospital and ER discharge instructions at any time in [email or web address]? You can also review all of your test results from your hospital stay or ER visit. Additional Information If you have questions, please visit the Frequently Asked Questions section of the [email or web address] website at [email or web address] Remember, [email or web address] is NOT to be used for urgent needs. For medical emergencies, dial 911. Now available from your iPhone and Android! General Information Please provide this summary of care documentation to your next provider. Patient Signature:  ____________________________________________________________ Date:  ____________________________________________________________  
  
Jaden Forte Provider Signature:  ____________________________________________________________ Date:  ____________________________________________________________

## 2017-05-03 NOTE — IP AVS SNAPSHOT
Current Discharge Medication List  
  
CONTINUE these medications which have NOT CHANGED Dose & Instructions Dispensing Information Comments Morning Noon Evening Bedtime ALVESCO 160 mcg/actuation [facility] Generic drug:  Ciclesonide Your last dose was: Your next dose is: Take  by inhalation. Refills:  0  
     
   
   
   
  
 ARICEPT 10 mg tablet Generic drug:  donepezil Your last dose was: Your next dose is:    
   
   
 Dose:  10 mg Take 10 mg by mouth nightly. Refills:  0 BENTYL 20 mg tablet Generic drug:  dicyclomine Your last dose was: Your next dose is:    
   
   
 Dose:  20 mg Take 20 mg by mouth four (4) times daily. Refills:  0 CeleBREX 200 mg capsule Generic drug:  celecoxib Your last dose was: Your next dose is:    
   
   
 Dose:  200 mg Take 200 mg by mouth two (2) times a day. Refills:  0 CLARINEX 5 mg tablet Generic drug:  desloratadine Your last dose was: Your next dose is:    
   
   
 Dose:  5 mg Take 5 mg by mouth daily. Refills:  0  
     
   
   
   
  
 fludrocortisone 0.1 mg tablet Commonly known as:  FLORINEF Your last dose was: Your next dose is:    
   
   
 Dose:  0.1 mg Take 0.1 mg by mouth daily. Refills:  0 HYDROcodone-acetaminophen 5-325 mg per tablet Commonly known as:  Marv [place] Your last dose was: Your next dose is:    
   
   
 Dose:  1 Tab Take 1 Tab by mouth every six (6) hours as needed for Pain. Max Daily Amount: 4 Tabs. Quantity:  17 Tab Refills:  0  
     
   
   
   
  
 meloxicam 15 mg tablet Commonly known as:  MOBIC Your last dose was: Your next dose is:    
   
   
 Dose:  15 mg Take 15 mg by mouth daily. Refills:  0 NexIUM 40 mg capsule Generic drug:  esomeprazole Your last dose was: Your next dose is:    
   
   
 Dose:  40 mg Take 40 mg by mouth daily. Refills:  0  
     
   
   
   
  
 OTHER Your last dose was: Your next dose is:    
   
   
 Takes keflex and does not know dose Refills:  0 REGLAN 5 mg tablet Generic drug:  metoclopramide HCl Your last dose was: Your next dose is:    
   
   
 Dose:  5 mg Take 5 mg by mouth four (4) times daily. Refills:  0  
     
   
   
   
  
 REQUIP 1 mg tablet Generic drug:  rOPINIRole Your last dose was: Your next dose is:    
   
   
 Dose:  4 mg Take 4 mg by mouth nightly. Refills:  0  
     
   
   
   
  
 rosuvastatin 20 mg tablet Commonly known as:  CRESTOR Your last dose was: Your next dose is:    
   
   
 Dose:  20 mg Take 20 mg by mouth nightly. Refills:  0 SEROquel 100 mg tablet Generic drug:  QUEtiapine Your last dose was: Your next dose is:    
   
   
 Dose:  200 mg Take 200 mg by mouth nightly. Refills:  0  
     
   
   
   
  
 traZODone 100 mg tablet Commonly known as:  Hermon Flores Your last dose was: Your next dose is:    
   
   
 Dose:  450 mg Take 450 mg by mouth nightly. Refills:  0 WELLBUTRIN  mg SR tablet Generic drug:  buPROPion SR Your last dose was: Your next dose is:    
   
   
 Dose:  150 mg Take 150 mg by mouth three (3) times daily. Refills:  0  
     
   
   
   
  
 XANAX 2 mg tablet Generic drug:  ALPRAZolam  
   
Your last dose was: Your next dose is:    
   
   
 Dose:  1 mg Take 1 mg by mouth three (3) times daily. Refills:  0

## 2017-05-03 NOTE — ANESTHESIA POSTPROCEDURE EVALUATION
Post-Anesthesia Evaluation and Assessment    Patient: Yonny Gurrola MRN: [id number]  SSN: [id number]    YOB: 1955  Age: 57 y.o. Sex: male       Cardiovascular Function/Vital Signs  Visit Vitals    /88    Pulse 75    Temp 36.5 °C (97.7 °F)    Resp 20    Ht 5' 7.25\" (1.708 m)    Wt 83.7 kg (184 lb 8.4 oz)    SpO2 99%    BMI 28.69 kg/m2       Patient is status post general, regional anesthesia for Procedure(s):  OPEN REDUCTION INTERNAL FIXATION RIGHT ANKLE (GEN/POP BLK). Nausea/Vomiting: None    Postoperative hydration reviewed and adequate. Pain:  Pain Scale 1: Visual (05/03/17 1533)  Pain Intensity 1: 0 (05/03/17 1549)   Managed    Neurological Status:   Neuro (WDL): Within Defined Limits (05/03/17 1533)  Neuro  Neurologic State: Alert (05/03/17 1533)   At baseline    Mental Status and Level of Consciousness: Arousable    Pulmonary Status:   O2 Device: Room air (05/03/17 1549)   Adequate oxygenation and airway patent    Complications related to anesthesia: None    Post-anesthesia assessment completed.  No concerns    Signed By: Narinder Dumont MD     May 3, 2017

## 2017-05-03 NOTE — OP NOTES
[facility]   [address] [facility], [address]   OP NOTE       Name:  Jessica Rosario   MR#:  [id number]   :  1955   Account #:  [de-identified]    Surgery Date:  2017   Date of Adm:  2017       PREOPERATIVE DIAGNOSIS: Right ankle distal fibula fracture. POSTOPERATIVE DIAGNOSIS: Right ankle distal fibula fracture. PROCEDURE PERFORMED: Open reduction and internal fixation,   right distal fibula. SURGEON: Abram De León MD.    ANESTHESIA: Popliteal block and general.    COMPLICATIONS: None encountered. TOURNIQUET TIME: 42 minutes. SPECIMENS REMOVED: None. ESTIMATED BLOOD LOSS: Nil. COMPLICATIONS: None. DESCRIPTION OF PROCEDURE: After consents were obtained and   preoperative sedation, was taken to the operating suites and   underwent a popliteal block and then general anesthesia without   difficulty. A pneumatic tourniquet was placed around the right thigh,   and the right lower extremity was scrubbed and draped in usual sterile   fashion. After Esmarch exsanguination, the tourniquet was inflated to   350 mmHg. A longitudinal incision about 6-8 cm in length was made   over the distal fibula. The incision was carried through skin and   subcutaneous tissue down to the deep fascia. The deep fascia was   divided in the interval between the anterior and peroneal compartment   proximally and it was carried down through the periosteum on the   subcutaneous margin of the distal fibula. Subperiosteal dissection was   carried out, exposing the fracture site and the 2 fragments of the fibula. The fracture was noncomminuted and a typical Urena B spiral   fracture. Reduction was carried out after clearing the fracture site. Organizing hematoma. Intraoperative x-ray showed anatomic   reduction. A 5-hole quarter tubular plate was placed posterolaterally for   buttress effect.  The plate was screwed into position with 3 screws proximal to the fracture site and all engaged bicortical with good   fixation. One screw distally across the fracture line was placed in a lag   fashion. The distal screw was a cancellous screw that was unicortex. Intraoperative x-ray showed anatomic reduction of the distal fibula and   restoration of normal alignment at the tibiotalar joint. The wounds were   copiously irrigated and closed. A 2-0 Monocryl suture was used in an   interrupted fashion to close the deep fascia and periosteal layer, 3-0   Monocryl suture was used in an interrupted fashion to close the   superficial fascia, and skin approximating staples were used to close   the skin. A bulky, compressive postoperative dressing with plaster   splints was applied. The tourniquet was deflated and the patient was   awakened from anesthesia and taken to the recovery room in   satisfactory condition.         MD Rita Aguirre / Teri Marroquin   D:  05/03/2017   14:56   T:  05/03/2017   15:12   Job #:  [id number]

## 2017-05-03 NOTE — ANESTHESIA PROCEDURE NOTES
Peripheral Block    Start time: 5/3/2017 1:35 PM  End time: 5/3/2017 1:44 PM  Performed by: Hever Cox  Authorized by: Hever Cox       Pre-procedure:    Indications: at surgeon's request and post-op pain management    Preanesthetic Checklist: patient identified, risks and benefits discussed, site marked, timeout performed, anesthesia consent given and patient being monitored    Timeout Time: 13:35          Block Type:   Block Type:  Popliteal  Laterality:  Right  Monitoring:  Continuous pulse ox, frequent vital sign checks, heart rate and responsive to questions  Injection Technique:  Single shot  Procedures: ultrasound guided and nerve stimulator    Patient Position: supine  Prep: chlorhexidine    Location:  Lower thigh  Needle Type:  Stimuplex  Needle Gauge:  22 G  Needle Localization:  Nerve stimulator and ultrasound guidance  Medication Injected:  0.5%  ropivacaine  Volume (mL):  30    Assessment:  Number of attempts:  1  Injection Assessment:  Incremental injection every 5 mL, local visualized surrounding nerve on ultrasound, negative aspiration for blood, no paresthesia and no intravascular symptoms  Patient tolerance:  Patient tolerated the procedure well with no immediate complications

## 2017-05-03 NOTE — DISCHARGE INSTRUCTIONS
See Dr. Madison Haines pre-printed discharge instructions. DISCHARGE SUMMARY from your Nurse    The following personal items collected during your admission are returned to you:   Dental Appliance: Dental Appliances: None  Vision: Visual Aid: Glasses  Hearing Aid:    Jewelry: Jewelry: None  Clothing: Clothing: Other (comment) (street clothes to locker)  Other Valuables: Other Valuables: None  Valuables sent to safe:      PATIENT INSTRUCTIONS:    After general anesthesia or intravenous sedation, for 24 hours or while taking prescription Narcotics:  · Limit your activities  · Do not drive and operate hazardous machinery  · Do not make important personal or business decisions  · Do  not drink alcoholic beverages  · If you have not urinated within 8 hours after discharge, please contact your surgeon on call. Report the following to your surgeon:  · Excessive pain, swelling, redness or odor of or around the surgical area  · Temperature over 100.5  · Nausea and vomiting lasting longer than 4 hours or if unable to take medications  · Any signs of decreased circulation or nerve impairment to extremity: change in color, persistent  numbness, tingling, coldness or increase pain  · Any questions    COUGH AND DEEP BREATHE    Breathing deep and coughing are very important exercises to do after surgery. Deep breathing and coughing open the little air tubes and air sacks in your lungs. You take deep breaths every day. You may not even notice - it is just something you do when you sigh or yawn. It is a natural exercise you do to keep these air passages open. After surgery, take deep breaths and cough, on purpose. Coughing and deep breathing help prevent bronchitis and pneumonia after surgery. If you had chest or belly surgery, use a pillow as a \"hug buddy\" and hold it tightly to your chest or belly when you cough. DIRECTIONS:  6. Take 10 to 15 slow deep breaths every hour while awake.   7. Breathe in deeply, and hold it for 2 seconds. 8. Exhale slowly through puckered lips, like blowing up a balloon. 9. After every 4th or 5th deep breath, hug your pillow to your chest or belly and give a hard, deep cough. Yes, it will probably hurt. But doing this exercise is very important part of healing after surgery. Take your pain medicine to help you do this exercise without too much pain. IF YOU HAVE BEEN DIAGNOSED WITH SLEEP APNEA, PLEASE USE YOUR SLEEIFP APNEA DEVICE OR CPAP MACHINE WHEN YOU INTEND TO NAP AFTER TAKING PAIN MEDICATION. Ankle Pumps    Ankle pumps increase the circulation of oxygenated blood to your lower extremities and decrease your risk for circulation problems such as blood clots. They also stretch the muscles, tendons and ligaments in your foot and ankle, and prevent joint contracture in the ankle and foot, especially after surgeries on the legs. It is important to do ankle pump exercises regularly after surgery because immobility increases your risk for developing a blood clot. Your doctor may also have you take an Aspirin for the next few days as well. If your doctor did not ask you to take an Aspirin, consult with him before starting Aspirin therapy on your own. Slowly point your foot forward, feeling the muscles on the top of your lower leg stretch, and hold this position for 5 seconds. Next, pull your foot back toward you as far as possible, stretching the calf muscles, and hold that position for 5 seconds. Repeat with the other foot. Perform 10 repetitions every hour while awake for both ankles if possible (down and then up with the foot once is one repetition). You should feel gentle stretching of the muscles in your lower leg when doing this exercise. If you feel pain, or your range of motion is limited, don't  Push too hard. Only go the limit your joint and muscles will let you go.   If you have increasing pain, progressively worsening leg warmth or swelling, STOP the exercise and call your doctor. Below is information about the medications your doctor is prescribing after your visit:    Other information in your discharge envelope:  []     PRESCRIPTIONS  []     PHYSICAL THERAPY PRESCRIPTION  []     APPOINTMENT CARDS  []     Regional Anesthesia Pamphlet for block or block with On-Q Catheter from Anesthesia Service  []     Medical device information sheets/pamphlets from their    []     School/work excuse note. []     /parent work excuse note. These are general instructions for a healthy lifestyle:    *  Please give a list of your current medications to your Primary Care Provider. *  Please update this list whenever your medications are discontinued, doses are      changed, or new medications (including over-the-counter products) are added. *  Please carry medication information at all times in case of emergency situations. About Smoking  No smoking / No tobacco products / Avoid exposure to second hand smoke    Surgeon General's Warning:  Quitting smoking now greatly reduces serious risk to your health. Obesity, smoking, and sedentary lifestyle greatly increases your risk for illness and disease. A healthy diet, regular physical exercise & weight monitoring are important for maintaining a healthy lifestyle. Congestive Heart Failure  You may be retaining fluid if you have a history of heart failure or if you experience any of the following symptoms:  Weight gain of 3 pounds or more overnight or 5 pounds in a week, increased swelling in our hands or feet or shortness of breath while lying flat in bed. Please call your doctor as soon as you notice any of these symptoms; do not wait until your next office visit.     Recognize signs and symptoms of STROKE:  F - face looks uneven  A - arms unable to move or move even  S - speech slurred or non-existent  T - time-call 911 as soon as signs and symptoms begin-DO NOT go         Back to bed or wait to see if you get better-TIME IS BRAIN. Warning signs of HEART ATTACK  Call 911 if you have these symptoms    · Chest discomfort. Most heart attacks involve discomfort in the center of the chest that lasts more than a few minutes, or that goes away and comes back. It can feel like uncomfortable pressure, squeezing, fullness, or pain. · Discomfort in other areas of the upper body. Symptoms can include pain or discomfort in one or both        Arms, the back, neck, jaw, or stomach. ·  Shortness of breath with or without chest discomfort. · Other signs may include breaking out in a cold sweat, nausea, or lightheadedness    Don't wait more than five minutes to call 911 - MINUTES MATTER! Fast action can save your life. Calling 911 is almost always the fastest way to get lifesaving treatment. Emergency Medical Services staff can begin treatment when they arrive - up to an hour sooner than if someone gets to the hospital by car. YONG MENDOZA MEDICATION AND SIDE EFFECT GUIDE    The Vitaliy Nava MEDICATION AND SIDE EFFECT GUIDE was provided to the PATIENT AND CARE PROVIDER.   Information provided includes instruction about drug purpose and common side effects for the following medications:    · oxycodone

## 2017-08-09 ENCOUNTER — HOSPITAL ENCOUNTER (OUTPATIENT)
Dept: CT IMAGING | Age: 62
Discharge: HOME | End: 2017-08-09
Attending: INTERNAL MEDICINE
Payer: MEDICARE

## 2017-08-09 DIAGNOSIS — C85.18 B-CELL LYMPHOMA OF LYMPH NODES OF MULTIPLE SITES (HCC): ICD-10-CM

## 2017-08-09 LAB — CREAT BLD-MCNC: 1.1 MG/DL (ref 0.6–1.3)

## 2017-08-09 PROCEDURE — 74177 CT ABD & PELVIS W/CONTRAST: CPT

## 2017-08-09 PROCEDURE — 82565 ASSAY OF CREATININE: CPT

## 2017-08-09 PROCEDURE — 74011636320 HC RX REV CODE- 636/320: Performed by: RADIOLOGY

## 2017-08-09 RX ADMIN — IOPAMIDOL 100 ML: 755 INJECTION, SOLUTION INTRAVENOUS at 14:00

## 2018-02-28 ENCOUNTER — HOSPITAL ENCOUNTER (OUTPATIENT)
Dept: CT IMAGING | Age: 63
Discharge: HOME | End: 2018-02-28
Attending: FAMILY MEDICINE
Payer: MEDICARE

## 2018-02-28 DIAGNOSIS — J98.01 BRONCHIAL SPASM: ICD-10-CM

## 2018-02-28 DIAGNOSIS — G58.8 INTERCOSTAL NEURALGIA: ICD-10-CM

## 2018-02-28 PROCEDURE — 71250 CT THORAX DX C-: CPT

## 2018-05-21 ENCOUNTER — HOSPITAL ENCOUNTER (OUTPATIENT)
Dept: CT IMAGING | Age: 63
Discharge: HOME | End: 2018-05-21
Attending: INTERNAL MEDICINE
Payer: MEDICARE

## 2018-05-21 ENCOUNTER — HOSPITAL ENCOUNTER (OUTPATIENT)
Dept: CT IMAGING | Age: 63
End: 2018-05-21
Attending: INTERNAL MEDICINE
Payer: MEDICARE

## 2018-05-21 DIAGNOSIS — C85.18 B-CELL LYMPHOMA OF LYMPH NODES OF MULTIPLE SITES (HCC): ICD-10-CM

## 2018-05-21 PROCEDURE — 74177 CT ABD & PELVIS W/CONTRAST: CPT

## 2018-05-21 PROCEDURE — 74011636320 HC RX REV CODE- 636/320: Performed by: RADIOLOGY

## 2018-05-21 RX ADMIN — IOPAMIDOL 94 ML: 755 INJECTION, SOLUTION INTRAVENOUS at 13:14

## 2019-07-15 ENCOUNTER — HOSPITAL ENCOUNTER (OUTPATIENT)
Dept: DIABETES SERVICES | Age: 64
Discharge: HOME | End: 2019-07-15
Payer: MEDICARE

## 2019-07-15 DIAGNOSIS — E11.9 DIABETES MELLITUS WITHOUT COMPLICATION (HCC): ICD-10-CM

## 2019-07-15 PROCEDURE — G0109 DIAB MANAGE TRN IND/GROUP: HCPCS | Performed by: DIETITIAN, REGISTERED

## 2021-05-25 ENCOUNTER — TRANSCRIBE ORDER (OUTPATIENT)
Dept: SCHEDULING | Age: 66
End: 2021-05-25

## 2021-05-25 DIAGNOSIS — C85.10 B-CELL LYMPHOMA (HCC): Primary | ICD-10-CM

## 2021-06-02 ENCOUNTER — HOSPITAL ENCOUNTER (OUTPATIENT)
Dept: CT IMAGING | Age: 66
Discharge: HOME | End: 2021-06-02
Attending: FAMILY MEDICINE
Payer: MEDICARE

## 2021-06-02 DIAGNOSIS — C85.10 B-CELL LYMPHOMA (HCC): ICD-10-CM

## 2021-06-02 LAB — CREAT BLD-MCNC: 1.2 MG/DL (ref 0.6–1.3)

## 2021-06-02 PROCEDURE — 71260 CT THORAX DX C+: CPT

## 2021-06-02 PROCEDURE — 74177 CT ABD & PELVIS W/CONTRAST: CPT

## 2021-06-02 PROCEDURE — 82565 ASSAY OF CREATININE: CPT

## 2021-06-02 PROCEDURE — 74011000636 HC RX REV CODE- 636

## 2021-06-02 RX ADMIN — IOPAMIDOL 100 ML: 755 INJECTION, SOLUTION INTRAVENOUS at 15:52

## 2021-06-14 ENCOUNTER — TRANSCRIBE ORDER (OUTPATIENT)
Dept: SCHEDULING | Age: 66
End: 2021-06-14

## 2021-06-14 DIAGNOSIS — N15.1 RENAL ABSCESS: Primary | ICD-10-CM

## 2021-06-14 DIAGNOSIS — N15.1 RENAL AND PERINEPHRIC ABSCESS: Primary | ICD-10-CM

## 2021-06-15 ENCOUNTER — TRANSCRIBE ORDER (OUTPATIENT)
Dept: SCHEDULING | Age: 66
End: 2021-06-15

## 2021-06-15 DIAGNOSIS — N15.1 RENAL AND PERINEPHRIC ABSCESS: Primary | ICD-10-CM

## 2021-06-24 ENCOUNTER — HOSPITAL ENCOUNTER (OUTPATIENT)
Dept: CT IMAGING | Age: 66
Discharge: HOME | End: 2021-06-24
Attending: FAMILY MEDICINE
Payer: MEDICARE

## 2021-06-24 VITALS
OXYGEN SATURATION: 100 % | RESPIRATION RATE: 17 BRPM | HEART RATE: 82 BPM | DIASTOLIC BLOOD PRESSURE: 73 MMHG | SYSTOLIC BLOOD PRESSURE: 136 MMHG

## 2021-06-24 DIAGNOSIS — N15.1 RENAL AND PERINEPHRIC ABSCESS: ICD-10-CM

## 2021-06-24 LAB
APPEARANCE FLD: ABNORMAL
COLOR FLD: ABNORMAL
COMMENT, HOLDF: NORMAL
ERYTHROCYTE [DISTWIDTH] IN BLOOD BY AUTOMATED COUNT: 14 % (ref 11.5–14.5)
HCT VFR BLD AUTO: 44.5 % (ref 36.6–50.3)
HGB BLD-MCNC: 14.5 G/DL (ref 12.1–17)
INR PPP: 1.1 (ref 0.9–1.1)
LYMPHOCYTES NFR FLD: 13 %
MCH RBC QN AUTO: 32.8 PG (ref 26–34)
MCHC RBC AUTO-ENTMCNC: 32.6 G/DL (ref 30–36.5)
MCV RBC AUTO: 100.7 FL (ref 80–99)
MONOS+MACROS NFR FLD: 5 %
NEUTROPHILS NFR FLD: 82 %
NRBC # BLD: 0 K/UL (ref 0–0.01)
NRBC BLD-RTO: 0 PER 100 WBC
NUC CELL # FLD: ABNORMAL /CU MM
PLATELET # BLD AUTO: 297 K/UL (ref 150–400)
PMV BLD AUTO: 8.6 FL (ref 8.9–12.9)
PROTHROMBIN TIME: 11.2 SEC (ref 9–11.1)
RBC # BLD AUTO: 4.42 M/UL (ref 4.1–5.7)
RBC # FLD: >100 /CU MM
SAMPLES BEING HELD,HOLD: NORMAL
SPECIMEN SOURCE FLD: ABNORMAL
WBC # BLD AUTO: 9.3 K/UL (ref 4.1–11.1)

## 2021-06-24 PROCEDURE — 87075 CULTR BACTERIA EXCEPT BLOOD: CPT

## 2021-06-24 PROCEDURE — 87186 SC STD MICRODIL/AGAR DIL: CPT

## 2021-06-24 PROCEDURE — 74177 CT ABD & PELVIS W/CONTRAST: CPT

## 2021-06-24 PROCEDURE — 85027 COMPLETE CBC AUTOMATED: CPT

## 2021-06-24 PROCEDURE — 36415 COLL VENOUS BLD VENIPUNCTURE: CPT

## 2021-06-24 PROCEDURE — 87205 SMEAR GRAM STAIN: CPT

## 2021-06-24 PROCEDURE — 85610 PROTHROMBIN TIME: CPT

## 2021-06-24 PROCEDURE — 77030028872 HC BN BIOP NDL ON CNTRL TY TELE -C

## 2021-06-24 PROCEDURE — 74011000636 HC RX REV CODE- 636: Performed by: RADIOLOGY

## 2021-06-24 PROCEDURE — 77030014115

## 2021-06-24 PROCEDURE — 74011250636 HC RX REV CODE- 250/636: Performed by: PHYSICIAN ASSISTANT

## 2021-06-24 PROCEDURE — 87077 CULTURE AEROBIC IDENTIFY: CPT

## 2021-06-24 PROCEDURE — 89050 BODY FLUID CELL COUNT: CPT

## 2021-06-24 PROCEDURE — 99153 MOD SED SAME PHYS/QHP EA: CPT

## 2021-06-24 PROCEDURE — 99152 MOD SED SAME PHYS/QHP 5/>YRS: CPT

## 2021-06-24 PROCEDURE — 50200 RENAL BIOPSY PERQ: CPT

## 2021-06-24 RX ORDER — MIDAZOLAM HYDROCHLORIDE 1 MG/ML
5 INJECTION, SOLUTION INTRAMUSCULAR; INTRAVENOUS
Status: DISCONTINUED | OUTPATIENT
Start: 2021-06-24 | End: 2021-06-24

## 2021-06-24 RX ORDER — FENTANYL CITRATE 50 UG/ML
100 INJECTION, SOLUTION INTRAMUSCULAR; INTRAVENOUS
Status: DISCONTINUED | OUTPATIENT
Start: 2021-06-24 | End: 2021-06-24

## 2021-06-24 RX ADMIN — MIDAZOLAM 1 MG: 1 INJECTION INTRAMUSCULAR; INTRAVENOUS at 13:06

## 2021-06-24 RX ADMIN — MIDAZOLAM 1 MG: 1 INJECTION INTRAMUSCULAR; INTRAVENOUS at 12:43

## 2021-06-24 RX ADMIN — FENTANYL CITRATE 25 MCG: 50 INJECTION, SOLUTION INTRAMUSCULAR; INTRAVENOUS at 12:38

## 2021-06-24 RX ADMIN — FENTANYL CITRATE 25 MCG: 50 INJECTION, SOLUTION INTRAMUSCULAR; INTRAVENOUS at 12:32

## 2021-06-24 RX ADMIN — MIDAZOLAM 1 MG: 1 INJECTION INTRAMUSCULAR; INTRAVENOUS at 13:38

## 2021-06-24 RX ADMIN — FENTANYL CITRATE 25 MCG: 50 INJECTION, SOLUTION INTRAMUSCULAR; INTRAVENOUS at 13:38

## 2021-06-24 RX ADMIN — MIDAZOLAM 1 MG: 1 INJECTION INTRAMUSCULAR; INTRAVENOUS at 13:27

## 2021-06-24 RX ADMIN — FENTANYL CITRATE 25 MCG: 50 INJECTION, SOLUTION INTRAMUSCULAR; INTRAVENOUS at 12:43

## 2021-06-24 RX ADMIN — MIDAZOLAM 1 MG: 1 INJECTION INTRAMUSCULAR; INTRAVENOUS at 12:38

## 2021-06-24 RX ADMIN — IOPAMIDOL 100 ML: 755 INJECTION, SOLUTION INTRAVENOUS at 10:27

## 2021-06-24 RX ADMIN — FENTANYL CITRATE 25 MCG: 50 INJECTION, SOLUTION INTRAMUSCULAR; INTRAVENOUS at 13:06

## 2021-06-24 RX ADMIN — MIDAZOLAM 1 MG: 1 INJECTION INTRAMUSCULAR; INTRAVENOUS at 12:32

## 2021-06-24 NOTE — DISCHARGE INSTRUCTIONS
What to Expect at Home  Your Recovery  The site may feel sore for several days. It can help to walk, take pain medicine, and put ice packs on the site. You will probably be able to return to work and your usual activities the day after the procedure. Your doctor or nurse will call you with the results of your test.  This care sheet gives you a general idea about how long it will take for you to recover. But each person recovers at a different pace. Follow the steps below to get better as quickly as possible. How can you care for yourself at home? Activity    · Rest when you feel tired. Getting enough sleep will help you recover. · You may drive when you are no longer taking pain pills and can quickly move your foot from the gas pedal to the brake. You must also be able to sit comfortably for a long period of time, even if you do not plan to go far. You might get caught in traffic. · Most people are able to return to work the day after the procedure. Medicines    · Your doctor will tell you if and when you can restart your medicines. He or she will also give you instructions about taking any new medicines. · If you take blood thinners, such as warfarin (Coumadin), clopidogrel (Plavix), or aspirin, be sure to talk to your doctor. He or she will tell you if and when to start taking those medicines again. Make sure that you understand exactly what your doctor wants you to do. · Be safe with medicines. Take pain medicines exactly as directed. ? If the doctor gave you a prescription medicine for pain, take it as prescribed. ? If you are not taking a prescription pain medicine, take an over-the-counter medicine such as acetaminophen (Tylenol), ibuprofen (Advil, Motrin), or naproxen (Aleve). Read and follow all instructions on the label. ? Do not take two or more pain medicines at the same time unless the doctor told you to. Many pain medicines have acetaminophen, which is Tylenol.  Too much acetaminophen (Tylenol) can be harmful. · If you think your pain medicine is making you sick to your stomach:  ? Take your medicine after meals (unless your doctor has told you not to). ? Ask your doctor for a different pain medicine. · If your doctor prescribed antibiotics, take them as directed. Do not stop taking them just because you feel better. Ice    · Put ice or a cold pack on the biopsy site for 10 to 20 minutes at a time. Put a thin cloth between the ice and your skin. Follow-up care is a key part of your treatment and safety. Be sure to make and go to all appointments, and call your doctor if you are having problems. It's also a good idea to know your test results and keep a list of the medicines you take. When should you call for help? Call 911 anytime you think you may need emergency care. For example, call if:    · You passed out (lost consciousness). Call your doctor now or seek immediate medical care if:    · You have signs of infection, such as:  ? Increased pain, swelling, warmth, or redness. ? Red streaks leading from the biopsy site. ? Pus draining from the biopsy site. ? Swollen lymph nodes in your neck, armpits, or groin. ? A fever. Watch closely for any changes in your health, and be sure to contact your doctor if:    · You are not getting better as expected. Where can you learn more? Go to http://www.gray.com/. Current as of: September 10, 2017  Content Version: 11.8  © 3171-8961 [employer]. Care instructions adapted under license by [employer] (which disclaims liability or warranty for this information). If you have questions about a medical condition or this instruction, always ask your healthcare professional. [place] [address] any warranty or liability for your use of this information.     If you have any questions or concerns please call [facility] at [phone] between the hours of 7:00 am and 3:30 pm or after hours call [phone]. Margoth Isbell RN, [facility])    Sedation for a Medical Procedure: After Your Visit  Instructions. Sedatives are used to relax you for a procedure. You may or may not be awake during the procedure. Common side effects of sedation medications include:                   Drowsiness, dizziness, euphoria, sleepiness or confusion. I              Unsteady gait. Loss of fine muscle control and delayed reaction time. Visual disturbances, difficulty focusing and blurred vision. Impaired memory recall. Follow-up care is a key component for your health and safety. Be sure to make and go to all medical appointments. Call your doctor if you are having problems. It's also a good idea to keep a list of your allergies, medicines with doses and test results on hand    Home care following your sedation procedure: You may experience some of these side effects or you may not be aware of subtle changes in your behavior or reaction time. Because you received these medications, we are giving you the following instructions: Activity    For your safety, you should not drive until the medicine wears off and you can think clearly and react easily. Do not drive for 24 hours.  Rest when you feel tired. Getting enough sleep will help you recover. Diet     You can eat your normal diet. If your stomach is upset, try bland, low-fat foods like plain rice, broiled chicken, toast, and yogurt.  Drink plenty of fluids unless your doctor advised you to limit your fluids.  Do not consume alcoholic beverages for 24 hours. Instructions   Do not make important personal, business, or legal decisions for 24 hours.  Move slowly and carefully, do not make sudden position changes.  Be alert for dizziness or lightheadedness and move accordingly.  Have a responsible person assist you.  Do not drive for 24 hours.    Do not operate equipment for 24 hours. [employer] mowers, power tools, kitchen appliances, etc.)    Discharge medications:   Resume prior to test medications as prescribed by your personal physician. If you have any questions or concerns call Radiology RN at [phone]  After hours call Radiology on-call at [phone], [facility]      Call 911 any time you think you may need emergency care. For example:                Call if you passed out (lost consciousness).  The medicine is not wearing off and you cannot think clearly. Watch closely for changes in your health, and be sure to contact your doctor if you have any problems. Where can you learn more? Go to nap- [employer].be   Enter G817 in the search box to learn more about \"Sedation for a Medical Procedure: After Your Visit. \"

## 2021-06-24 NOTE — PROGRESS NOTES
0930  Pt sitting in waiting area. Explained that CT would be taking pt for images prior to being taken back for aspiration of fluid collection. Pt requesting BR. Pt taken to BR via W/C, and then returned to waiting area. 1045 Pt brought to [facility]. Has had IV placed and CT scan done by CT.    S1S2 LSC.  1100  Pt states he does not want BP taken. Liang Vincent PA in to see pt and discuss procedure and sedation plan with pt and RN. Also explain necessity of taking BPs for monitoring VS.  Allergies reviewed. Consent signed. Pt agrees to BPs. Pt does not want blood products and wants to be a DNR. 1140 Labs drawn and sent to lab. 1225  Pt brought to CT for procedure. Sedation started at  1232. Procedure started at 1301 and ended at [phone] [id number]. Pt received a total of  6mg of versed and 125mcg of fentanyl over the course of procedure. Aspirated 75cc of purulent tan drainage. Pt tolerated procedure well. Denies pain. Dressing to procedure site CDI. VS monitored throughout procedure. 1400 Pt returned to [facility]. Given crackers and diet ginger ale. Spoke with friend Mr Kaylan Roy to let him know procedure complete and pt doing well. 1430 Reviewed discharge instructions with patient. Opportunity given for questions. Pt verbalized understanding. Copy provided. 1450 IV removed. Assisted pt to dress and get into w/c. Escorted pt out to vehicle in w/c for discharge to home with Mr Kaylan Roy.

## 2021-06-24 NOTE — H&P
Interventional Radiology History and Physical      Patient: Raymond Chaudhari 76 y.o. male  [id number]    Consult Requested by:  Lois Alexander MD    Chief Complaint: back pain, retroperitoneal fluid collection    History of Present Illness: 77 yo male with history of moderate back pain for several months. CT scan from 6/2/21 demonstrates a perinephric/retroperitoneal fluid collection concerning for abscess. Radiology has been consulted for image-guided aspiration. History:  Past Medical History:   Diagnosis Date    Anxiety     Arthritis     Asthma     Burning with urination     Depression     Diabetes ([address]     GERD (gastroesophageal reflux disease)     Ill-defined condition     lymphoma  (microscopic)    Memory loss     Psychiatric diagnosis     Generalized Anxiety Disorder    Psychiatric disorder     Anxiety    Psychiatric disorder     Depression     Family History   Problem Relation Age of Onset    Heart Disease Father     Dementia Father     Dementia Mother     Headache Mother     Migraines Mother     Neuropathy Mother      Social History     Socioeconomic History    Marital status:      Spouse name: Not on file    Number of children: Not on file    Years of education: Not on file    Highest education level: Not on file   Occupational History    Not on file   Tobacco Use    Smoking status: Never Smoker    Smokeless tobacco: Never Used   Substance and Sexual Activity    Alcohol use: Yes     Comment: occasional    Drug use: No    Sexual activity: Not Currently   Other Topics Concern    Not on file   Social History Narrative    Lives with wife. Social Determinants of Health     Financial Resource Strain:     Difficulty of Paying Living Expenses:    Food Insecurity:     Worried About Running Out of Food in the Last Year:     [address] in the Last Year:    Transportation Needs:     Lack of Transportation (Medical):      Lack of Transportation (Non-Medical): Physical Activity:     Days of Exercise per Week:     Minutes of Exercise per Session:    Stress:     Feeling of Stress :    Social Connections:     Frequency of Communication with Friends and Family:     Frequency of Social Gatherings with Friends and Family:     Attends Adventism Services:     Active Member of Clubs or Organizations:     Attends Club or Organization Meetings:     Marital Status:    Intimate Partner Violence:     Fear of Current or Ex-Partner:     Emotionally Abused:     Physically Abused:     Sexually Abused: Allergies: No Known Allergies    Current Medications:  Current Outpatient Medications   Medication Sig    OTHER Takes keflex and does not know dose    HYDROcodone-acetaminophen (NORCO) 5-325 mg per tablet Take 1 Tab by mouth every six (6) hours as needed for Pain. Max Daily Amount: 4 Tabs.  rosuvastatin (CRESTOR) 20 mg tablet Take 20 mg by mouth nightly.  metoclopramide HCl (REGLAN) 5 mg tablet Take 5 mg by mouth four (4) times daily.  meloxicam (MOBIC) 15 mg tablet Take 15 mg by mouth daily.  donepezil (ARICEPT) 10 mg tablet Take 10 mg by mouth nightly.  ALPRAZolam (XANAX) 2 mg tablet Take 1 mg by mouth three (3) times daily.  QUEtiapine (SEROQUEL) 100 mg tablet Take 200 mg by mouth nightly.  buPROPion SR (WELLBUTRIN SR) 150 mg SR tablet Take 150 mg by mouth three (3) times daily.  esomeprazole (NEXIUM) 40 mg capsule Take 40 mg by mouth daily.  traZODone (DESYREL) 100 mg tablet Take 450 mg by mouth nightly.  rOPINIRole (REQUIP) 1 mg tablet Take 4 mg by mouth nightly.  fludrocortisone (FLORINEF) 0.1 mg tablet Take 0.1 mg by mouth daily.  Ciclesonide (ALVESCO) 160 mcg/actuation HFAA Take  by inhalation.  desloratadine (CLARINEX) 5 mg tablet Take 5 mg by mouth daily.  celecoxib (CELEBREX) 200 mg capsule Take 200 mg by mouth two (2) times a day.  dicyclomine (BENTYL) 20 mg tablet Take 20 mg by mouth four (4) times daily.      No current facility-administered medications for this encounter. Review of Systems:  Patient denies fever, chills, cough, headache, vision changes, difficulty swallowing, shortness of breath, chest pain, abdominal pain, nausea, vomiting, changes in bladder or bowel habits, extremity weakness, numbness, tingling or swelling. The remainder of the review of systems is negative for any additional contributing elements. Physical Exam:  There were no vitals taken for this visit. GENERAL: alert, cooperative, no distress, appears stated age, mildly obese,   LUNG: clear to auscultation bilaterally,   HEART: regular rate and rhythm,   ABDOMEN: soft, non-tender. Bowel sounds normal.   NEUROLOGIC: AOx3. Cranial nerves 2-12 and sensation grossly intact. Laboratory:    No results for input(s): HGB, HGBEXT, HCT, HCTEXT, WBC, PLT, PLTEXT, INR, BUN, CREA, K, CRCLT, HGBEXT, HCTEXT, PLTEXT, INREXT in the last 72 hours. No lab exists for component: PTT, PT    Imaging:  No results found. Impression/Plan:  Patient has been evaluated and deemed an appropriate candidate for intravenous sedation. Based on history and presentation he is a candidate for CT-guided retroperitoneal fluid collection aspiration. The above procedure was explained to the patient/consenting party. Benefits, risks and alternative therapies reviewed and all questions answered to his satisfaction. At this time he wishes to proceed. Please note that Dr. John Orozco participated in the provision of these services. We appreciate the kind consultation and the opportunity to participate in [facility]. Cliff Vargas PA-C  Interventional Radiology  [facility] Radiology, Dorota Davidson.  [facility] [place]  [phone]  [facility] [phone]      CC:  Balta Lizarraga MD

## 2021-06-26 LAB
BACTERIA SPEC CULT: NORMAL
SERVICE CMNT-IMP: NORMAL

## 2021-06-27 LAB
BACTERIA SPEC CULT: ABNORMAL
BACTERIA SPEC CULT: ABNORMAL
GRAM STN SPEC: ABNORMAL
GRAM STN SPEC: ABNORMAL
SERVICE CMNT-IMP: ABNORMAL

## 2023-05-11 RX ORDER — DICYCLOMINE HCL 20 MG
TABLET ORAL 4 TIMES DAILY
COMMUNITY

## 2023-05-11 RX ORDER — DESLORATADINE 5 MG/1
5 TABLET ORAL DAILY
COMMUNITY

## 2023-05-11 RX ORDER — METOCLOPRAMIDE 5 MG/1
TABLET ORAL 4 TIMES DAILY
COMMUNITY

## 2023-05-11 RX ORDER — BUPROPION HYDROCHLORIDE 150 MG/1
TABLET, EXTENDED RELEASE ORAL 3 TIMES DAILY
COMMUNITY

## 2023-05-11 RX ORDER — ROPINIROLE 1 MG/1
4 TABLET, FILM COATED ORAL NIGHTLY
COMMUNITY

## 2023-05-11 RX ORDER — MELOXICAM 15 MG/1
15 TABLET ORAL DAILY
COMMUNITY

## 2023-05-11 RX ORDER — CELECOXIB 200 MG/1
CAPSULE ORAL 2 TIMES DAILY
COMMUNITY

## 2023-05-11 RX ORDER — HYDROCODONE BITARTRATE AND ACETAMINOPHEN 5; 325 MG/1; MG/1
1 TABLET ORAL EVERY 6 HOURS PRN
COMMUNITY
Start: 2017-04-26

## 2023-05-11 RX ORDER — ALPRAZOLAM 2 MG/1
TABLET ORAL 3 TIMES DAILY
COMMUNITY

## 2023-05-11 RX ORDER — ESOMEPRAZOLE MAGNESIUM 40 MG/1
40 CAPSULE, DELAYED RELEASE ORAL DAILY
COMMUNITY

## 2023-05-11 RX ORDER — ROSUVASTATIN CALCIUM 20 MG/1
20 TABLET, COATED ORAL NIGHTLY
COMMUNITY

## 2023-05-11 RX ORDER — FLUDROCORTISONE ACETATE 0.1 MG/1
TABLET ORAL DAILY
COMMUNITY

## 2023-05-11 RX ORDER — TRAZODONE HYDROCHLORIDE 100 MG/1
TABLET ORAL
COMMUNITY

## 2023-05-11 RX ORDER — DONEPEZIL HYDROCHLORIDE 10 MG/1
10 TABLET, FILM COATED ORAL NIGHTLY
COMMUNITY

## 2023-05-11 RX ORDER — QUETIAPINE FUMARATE 100 MG/1
200 TABLET, FILM COATED ORAL NIGHTLY
COMMUNITY

## 2023-09-20 ENCOUNTER — APPOINTMENT (OUTPATIENT)
Facility: HOSPITAL | Age: 68
End: 2023-09-20
Payer: MEDICARE

## 2023-09-20 ENCOUNTER — HOSPITAL ENCOUNTER (INPATIENT)
Facility: HOSPITAL | Age: 68
LOS: 3 days | Discharge: HOME | End: 2023-09-23
Attending: EMERGENCY MEDICINE | Admitting: HOSPITALIST
Payer: MEDICARE

## 2023-09-20 DIAGNOSIS — T68.XXXA HYPOTHERMIA, INITIAL ENCOUNTER: ICD-10-CM

## 2023-09-20 DIAGNOSIS — R41.0 DELIRIUM: Primary | ICD-10-CM

## 2023-09-20 LAB
ALBUMIN SERPL-MCNC: 3.8 G/DL (ref 3.5–5)
ALBUMIN/GLOB SERPL: 1.2 (ref 1.1–2.2)
ALP SERPL-CCNC: 73 U/L (ref 45–117)
ALT SERPL-CCNC: 94 U/L (ref 12–78)
ANION GAP SERPL CALC-SCNC: 4 MMOL/L (ref 5–15)
APPEARANCE UR: CLEAR
AST SERPL-CCNC: 43 U/L (ref 15–37)
BACTERIA URNS QL MICRO: NEGATIVE /HPF
BASOPHILS # BLD: 0.1 K/UL (ref 0–0.1)
BASOPHILS NFR BLD: 1 % (ref 0–1)
BILIRUB SERPL-MCNC: 0.6 MG/DL (ref 0.2–1)
BILIRUB UR QL: NEGATIVE
BUN SERPL-MCNC: 30 MG/DL (ref 6–20)
BUN/CREAT SERPL: 26 (ref 12–20)
CALCIUM SERPL-MCNC: 8.8 MG/DL (ref 8.5–10.1)
CHLORIDE SERPL-SCNC: 108 MMOL/L (ref 97–108)
CK SERPL-CCNC: 157 U/L (ref 39–308)
CO2 SERPL-SCNC: 26 MMOL/L (ref 21–32)
COLOR UR: ABNORMAL
COMMENT:: NORMAL
CREAT SERPL-MCNC: 1.15 MG/DL (ref 0.7–1.3)
DIFFERENTIAL METHOD BLD: ABNORMAL
EKG ATRIAL RATE: 81 BPM
EKG DIAGNOSIS: NORMAL
EKG P AXIS: 34 DEGREES
EKG P-R INTERVAL: 162 MS
EKG Q-T INTERVAL: 366 MS
EKG QRS DURATION: 80 MS
EKG QTC CALCULATION (BAZETT): 425 MS
EKG R AXIS: 42 DEGREES
EKG T AXIS: 79 DEGREES
EKG VENTRICULAR RATE: 81 BPM
EOSINOPHIL # BLD: 0.3 K/UL (ref 0–0.4)
EOSINOPHIL NFR BLD: 4 % (ref 0–7)
EPITH CASTS URNS QL MICRO: ABNORMAL /LPF
ERYTHROCYTE [DISTWIDTH] IN BLOOD BY AUTOMATED COUNT: 12.5 % (ref 11.5–14.5)
GLOBULIN SER CALC-MCNC: 3.3 G/DL (ref 2–4)
GLUCOSE BLD STRIP.AUTO-MCNC: 120 MG/DL (ref 65–117)
GLUCOSE BLD STRIP.AUTO-MCNC: 121 MG/DL (ref 65–117)
GLUCOSE BLD STRIP.AUTO-MCNC: 126 MG/DL (ref 65–117)
GLUCOSE BLD STRIP.AUTO-MCNC: 144 MG/DL (ref 65–117)
GLUCOSE SERPL-MCNC: 150 MG/DL (ref 65–100)
GLUCOSE UR STRIP.AUTO-MCNC: NEGATIVE MG/DL
HCT VFR BLD AUTO: 45.2 % (ref 36.6–50.3)
HGB BLD-MCNC: 14.9 G/DL (ref 12.1–17)
HGB UR QL STRIP: ABNORMAL
HYALINE CASTS URNS QL MICRO: ABNORMAL /LPF (ref 0–2)
IMM GRANULOCYTES # BLD AUTO: 0 K/UL (ref 0–0.04)
IMM GRANULOCYTES NFR BLD AUTO: 1 % (ref 0–0.5)
KETONES UR QL STRIP.AUTO: NEGATIVE MG/DL
LACTATE BLD-SCNC: 0.64 MMOL/L (ref 0.4–2)
LEUKOCYTE ESTERASE UR QL STRIP.AUTO: NEGATIVE
LYMPHOCYTES # BLD: 1.5 K/UL (ref 0.8–3.5)
LYMPHOCYTES NFR BLD: 21 % (ref 12–49)
MCH RBC QN AUTO: 32.2 PG (ref 26–34)
MCHC RBC AUTO-ENTMCNC: 33 G/DL (ref 30–36.5)
MCV RBC AUTO: 97.6 FL (ref 80–99)
MONOCYTES # BLD: 0.9 K/UL (ref 0–1)
MONOCYTES NFR BLD: 12 % (ref 5–13)
NEUTS SEG # BLD: 4.5 K/UL (ref 1.8–8)
NEUTS SEG NFR BLD: 61 % (ref 32–75)
NITRITE UR QL STRIP.AUTO: NEGATIVE
NRBC # BLD: 0 K/UL (ref 0–0.01)
NRBC BLD-RTO: 0 PER 100 WBC
PH UR STRIP: 5 (ref 5–8)
PLATELET # BLD AUTO: 265 K/UL (ref 150–400)
PMV BLD AUTO: 10.4 FL (ref 8.9–12.9)
POTASSIUM SERPL-SCNC: 4.1 MMOL/L (ref 3.5–5.1)
PROCALCITONIN SERPL-MCNC: <0.05 NG/ML
PROT SERPL-MCNC: 7.1 G/DL (ref 6.4–8.2)
PROT UR STRIP-MCNC: NEGATIVE MG/DL
RBC # BLD AUTO: 4.63 M/UL (ref 4.1–5.7)
RBC #/AREA URNS HPF: ABNORMAL /HPF (ref 0–5)
SARS-COV-2 RDRP RESP QL NAA+PROBE: NOT DETECTED
SERVICE CMNT-IMP: ABNORMAL
SODIUM SERPL-SCNC: 138 MMOL/L (ref 136–145)
SOURCE: NORMAL
SP GR UR REFRACTOMETRY: 1.02 (ref 1–1.03)
SPECIMEN HOLD: NORMAL
TROPONIN I SERPL HS-MCNC: 7 NG/L (ref 0–76)
TSH SERPL DL<=0.05 MIU/L-ACNC: 1.81 UIU/ML (ref 0.36–3.74)
URINE CULTURE IF INDICATED: ABNORMAL
UROBILINOGEN UR QL STRIP.AUTO: 0.2 EU/DL (ref 0.2–1)
WBC # BLD AUTO: 7.3 K/UL (ref 4.1–11.1)
WBC URNS QL MICRO: ABNORMAL /HPF (ref 0–4)

## 2023-09-20 PROCEDURE — 6370000000 HC RX 637 (ALT 250 FOR IP): Performed by: HOSPITALIST

## 2023-09-20 PROCEDURE — 84145 PROCALCITONIN (PCT): CPT

## 2023-09-20 PROCEDURE — 6360000002 HC RX W HCPCS: Performed by: HOSPITALIST

## 2023-09-20 PROCEDURE — 83605 ASSAY OF LACTIC ACID: CPT

## 2023-09-20 PROCEDURE — 71045 X-RAY EXAM CHEST 1 VIEW: CPT

## 2023-09-20 PROCEDURE — 2580000003 HC RX 258: Performed by: HOSPITALIST

## 2023-09-20 PROCEDURE — 93010 ELECTROCARDIOGRAM REPORT: CPT | Performed by: STUDENT IN AN ORGANIZED HEALTH CARE EDUCATION/TRAINING PROGRAM

## 2023-09-20 PROCEDURE — 87040 BLOOD CULTURE FOR BACTERIA: CPT

## 2023-09-20 PROCEDURE — 82550 ASSAY OF CK (CPK): CPT

## 2023-09-20 PROCEDURE — 84484 ASSAY OF TROPONIN QUANT: CPT

## 2023-09-20 PROCEDURE — 82962 GLUCOSE BLOOD TEST: CPT

## 2023-09-20 PROCEDURE — 93005 ELECTROCARDIOGRAM TRACING: CPT | Performed by: EMERGENCY MEDICINE

## 2023-09-20 PROCEDURE — 85025 COMPLETE CBC W/AUTO DIFF WBC: CPT

## 2023-09-20 PROCEDURE — 84443 ASSAY THYROID STIM HORMONE: CPT

## 2023-09-20 PROCEDURE — 87635 SARS-COV-2 COVID-19 AMP PRB: CPT

## 2023-09-20 PROCEDURE — 2500000003 HC RX 250 WO HCPCS

## 2023-09-20 PROCEDURE — 70450 CT HEAD/BRAIN W/O DYE: CPT

## 2023-09-20 PROCEDURE — 36415 COLL VENOUS BLD VENIPUNCTURE: CPT

## 2023-09-20 PROCEDURE — 6360000002 HC RX W HCPCS

## 2023-09-20 PROCEDURE — 2000000000 HC ICU R&B

## 2023-09-20 PROCEDURE — 81001 URINALYSIS AUTO W/SCOPE: CPT

## 2023-09-20 PROCEDURE — 94761 N-INVAS EAR/PLS OXIMETRY MLT: CPT

## 2023-09-20 PROCEDURE — 83735 ASSAY OF MAGNESIUM: CPT

## 2023-09-20 PROCEDURE — 80053 COMPREHEN METABOLIC PANEL: CPT

## 2023-09-20 PROCEDURE — 99285 EMERGENCY DEPT VISIT HI MDM: CPT

## 2023-09-20 RX ORDER — FLUDROCORTISONE ACETATE 0.1 MG/1
0.1 TABLET ORAL DAILY
Status: DISCONTINUED | OUTPATIENT
Start: 2023-09-20 | End: 2023-09-20

## 2023-09-20 RX ORDER — ONDANSETRON 4 MG/1
4 TABLET, ORALLY DISINTEGRATING ORAL EVERY 8 HOURS PRN
Status: DISCONTINUED | OUTPATIENT
Start: 2023-09-20 | End: 2023-09-23 | Stop reason: HOSPADM

## 2023-09-20 RX ORDER — LORAZEPAM 2 MG/ML
INJECTION INTRAMUSCULAR
Status: DISPENSED
Start: 2023-09-20 | End: 2023-09-21

## 2023-09-20 RX ORDER — ACETAMINOPHEN 325 MG/1
650 TABLET ORAL EVERY 6 HOURS PRN
Status: DISCONTINUED | OUTPATIENT
Start: 2023-09-20 | End: 2023-09-23 | Stop reason: HOSPADM

## 2023-09-20 RX ORDER — PANTOPRAZOLE SODIUM 40 MG/1
40 TABLET, DELAYED RELEASE ORAL
Status: DISCONTINUED | OUTPATIENT
Start: 2023-09-21 | End: 2023-09-23 | Stop reason: HOSPADM

## 2023-09-20 RX ORDER — LORATADINE 10 MG/1
5 TABLET ORAL 2 TIMES DAILY
COMMUNITY

## 2023-09-20 RX ORDER — LORAZEPAM 2 MG/ML
2 INJECTION INTRAMUSCULAR ONCE
Status: COMPLETED | OUTPATIENT
Start: 2023-09-20 | End: 2023-09-20

## 2023-09-20 RX ORDER — TRAZODONE HYDROCHLORIDE 50 MG/1
100 TABLET ORAL NIGHTLY PRN
Status: DISCONTINUED | OUTPATIENT
Start: 2023-09-20 | End: 2023-09-20

## 2023-09-20 RX ORDER — MEMANTINE HYDROCHLORIDE 10 MG/1
10 TABLET ORAL 2 TIMES DAILY
Status: DISCONTINUED | OUTPATIENT
Start: 2023-09-21 | End: 2023-09-23 | Stop reason: HOSPADM

## 2023-09-20 RX ORDER — BUPROPION HYDROCHLORIDE 150 MG/1
150 TABLET, EXTENDED RELEASE ORAL 2 TIMES DAILY
Status: DISCONTINUED | OUTPATIENT
Start: 2023-09-20 | End: 2023-09-23 | Stop reason: HOSPADM

## 2023-09-20 RX ORDER — DICYCLOMINE HCL 20 MG
20 TABLET ORAL 4 TIMES DAILY
Status: DISCONTINUED | OUTPATIENT
Start: 2023-09-20 | End: 2023-09-20

## 2023-09-20 RX ORDER — ENOXAPARIN SODIUM 100 MG/ML
40 INJECTION SUBCUTANEOUS DAILY
Status: DISCONTINUED | OUTPATIENT
Start: 2023-09-20 | End: 2023-09-23 | Stop reason: HOSPADM

## 2023-09-20 RX ORDER — INSULIN LISPRO 100 [IU]/ML
0-4 INJECTION, SOLUTION INTRAVENOUS; SUBCUTANEOUS
Status: DISCONTINUED | OUTPATIENT
Start: 2023-09-20 | End: 2023-09-23 | Stop reason: HOSPADM

## 2023-09-20 RX ORDER — HALOPERIDOL 5 MG/ML
1 INJECTION INTRAMUSCULAR ONCE
Status: DISCONTINUED | OUTPATIENT
Start: 2023-09-20 | End: 2023-09-20

## 2023-09-20 RX ORDER — ROSUVASTATIN CALCIUM 10 MG/1
20 TABLET, COATED ORAL NIGHTLY
Status: DISCONTINUED | OUTPATIENT
Start: 2023-09-20 | End: 2023-09-20

## 2023-09-20 RX ORDER — HALOPERIDOL 5 MG/ML
5 INJECTION INTRAMUSCULAR ONCE
Status: COMPLETED | OUTPATIENT
Start: 2023-09-20 | End: 2023-09-20

## 2023-09-20 RX ORDER — ONDANSETRON 2 MG/ML
4 INJECTION INTRAMUSCULAR; INTRAVENOUS EVERY 6 HOURS PRN
Status: DISCONTINUED | OUTPATIENT
Start: 2023-09-20 | End: 2023-09-23 | Stop reason: HOSPADM

## 2023-09-20 RX ORDER — SODIUM CHLORIDE 0.9 % (FLUSH) 0.9 %
5-40 SYRINGE (ML) INJECTION PRN
Status: DISCONTINUED | OUTPATIENT
Start: 2023-09-20 | End: 2023-09-23 | Stop reason: HOSPADM

## 2023-09-20 RX ORDER — GLIPIZIDE 10 MG/1
10 TABLET, FILM COATED, EXTENDED RELEASE ORAL
COMMUNITY

## 2023-09-20 RX ORDER — DEXTROSE MONOHYDRATE 100 MG/ML
INJECTION, SOLUTION INTRAVENOUS CONTINUOUS PRN
Status: DISCONTINUED | OUTPATIENT
Start: 2023-09-20 | End: 2023-09-23 | Stop reason: HOSPADM

## 2023-09-20 RX ORDER — GLIPIZIDE 5 MG/1
10 TABLET ORAL
Status: DISCONTINUED | OUTPATIENT
Start: 2023-09-21 | End: 2023-09-23 | Stop reason: HOSPADM

## 2023-09-20 RX ORDER — POLYETHYLENE GLYCOL 3350 17 G/17G
17 POWDER, FOR SOLUTION ORAL DAILY PRN
Status: DISCONTINUED | OUTPATIENT
Start: 2023-09-20 | End: 2023-09-23 | Stop reason: HOSPADM

## 2023-09-20 RX ORDER — QUETIAPINE FUMARATE 100 MG/1
200 TABLET, FILM COATED ORAL NIGHTLY
Status: DISCONTINUED | OUTPATIENT
Start: 2023-09-20 | End: 2023-09-23 | Stop reason: HOSPADM

## 2023-09-20 RX ORDER — MEMANTINE HYDROCHLORIDE 10 MG/1
10 TABLET ORAL 2 TIMES DAILY
COMMUNITY

## 2023-09-20 RX ORDER — ROPINIROLE 1 MG/1
4 TABLET, FILM COATED ORAL NIGHTLY
Status: DISCONTINUED | OUTPATIENT
Start: 2023-09-20 | End: 2023-09-23 | Stop reason: HOSPADM

## 2023-09-20 RX ORDER — INSULIN LISPRO 100 [IU]/ML
0-4 INJECTION, SOLUTION INTRAVENOUS; SUBCUTANEOUS NIGHTLY
Status: DISCONTINUED | OUTPATIENT
Start: 2023-09-20 | End: 2023-09-23 | Stop reason: HOSPADM

## 2023-09-20 RX ORDER — DEXMEDETOMIDINE HYDROCHLORIDE 4 UG/ML
.1-1.5 INJECTION, SOLUTION INTRAVENOUS CONTINUOUS
Status: DISCONTINUED | OUTPATIENT
Start: 2023-09-20 | End: 2023-09-22

## 2023-09-20 RX ORDER — TAMSULOSIN HYDROCHLORIDE 0.4 MG/1
0.4 CAPSULE ORAL
COMMUNITY

## 2023-09-20 RX ORDER — HALOPERIDOL 5 MG/ML
INJECTION INTRAMUSCULAR
Status: COMPLETED
Start: 2023-09-20 | End: 2023-09-20

## 2023-09-20 RX ORDER — ACETAMINOPHEN 650 MG/1
650 SUPPOSITORY RECTAL EVERY 6 HOURS PRN
Status: DISCONTINUED | OUTPATIENT
Start: 2023-09-20 | End: 2023-09-23 | Stop reason: HOSPADM

## 2023-09-20 RX ORDER — DONEPEZIL HYDROCHLORIDE 5 MG/1
10 TABLET, FILM COATED ORAL NIGHTLY
Status: DISCONTINUED | OUTPATIENT
Start: 2023-09-20 | End: 2023-09-23 | Stop reason: HOSPADM

## 2023-09-20 RX ORDER — LORAZEPAM 2 MG/ML
2 INJECTION INTRAMUSCULAR ONCE
Status: DISCONTINUED | OUTPATIENT
Start: 2023-09-20 | End: 2023-09-20

## 2023-09-20 RX ORDER — HYDROCODONE BITARTRATE AND ACETAMINOPHEN 5; 325 MG/1; MG/1
1 TABLET ORAL EVERY 6 HOURS PRN
Status: DISCONTINUED | OUTPATIENT
Start: 2023-09-20 | End: 2023-09-23 | Stop reason: HOSPADM

## 2023-09-20 RX ORDER — SODIUM CHLORIDE 9 MG/ML
INJECTION, SOLUTION INTRAVENOUS PRN
Status: DISCONTINUED | OUTPATIENT
Start: 2023-09-20 | End: 2023-09-23 | Stop reason: HOSPADM

## 2023-09-20 RX ORDER — SOLIFENACIN SUCCINATE 10 MG/1
10 TABLET, FILM COATED ORAL
Status: ON HOLD | COMMUNITY
End: 2023-09-23 | Stop reason: HOSPADM

## 2023-09-20 RX ORDER — ALPRAZOLAM 0.5 MG/1
1 TABLET ORAL 3 TIMES DAILY PRN
Status: DISCONTINUED | OUTPATIENT
Start: 2023-09-20 | End: 2023-09-20

## 2023-09-20 RX ORDER — SODIUM CHLORIDE 0.9 % (FLUSH) 0.9 %
5-40 SYRINGE (ML) INJECTION EVERY 12 HOURS SCHEDULED
Status: DISCONTINUED | OUTPATIENT
Start: 2023-09-20 | End: 2023-09-23 | Stop reason: HOSPADM

## 2023-09-20 RX ORDER — ROSUVASTATIN CALCIUM 10 MG/1
20 TABLET, COATED ORAL DAILY
Status: DISCONTINUED | OUTPATIENT
Start: 2023-09-21 | End: 2023-09-23 | Stop reason: HOSPADM

## 2023-09-20 RX ORDER — METOCLOPRAMIDE 10 MG/1
5 TABLET ORAL 4 TIMES DAILY
Status: DISCONTINUED | OUTPATIENT
Start: 2023-09-20 | End: 2023-09-20

## 2023-09-20 RX ORDER — METFORMIN HYDROCHLORIDE 500 MG/1
1000 TABLET, EXTENDED RELEASE ORAL
COMMUNITY

## 2023-09-20 RX ADMIN — ROPINIROLE HYDROCHLORIDE 4 MG: 1 TABLET, FILM COATED ORAL at 21:21

## 2023-09-20 RX ADMIN — DONEPEZIL HYDROCHLORIDE 10 MG: 5 TABLET, FILM COATED ORAL at 21:21

## 2023-09-20 RX ADMIN — SODIUM CHLORIDE, PRESERVATIVE FREE 10 ML: 5 INJECTION INTRAVENOUS at 21:00

## 2023-09-20 RX ADMIN — HALOPERIDOL 5 MG: 5 INJECTION INTRAMUSCULAR at 22:16

## 2023-09-20 RX ADMIN — QUETIAPINE FUMARATE 200 MG: 100 TABLET ORAL at 21:21

## 2023-09-20 RX ADMIN — BUPROPION HYDROCHLORIDE 150 MG: 150 TABLET, EXTENDED RELEASE ORAL at 21:21

## 2023-09-20 RX ADMIN — ENOXAPARIN SODIUM 40 MG: 100 INJECTION SUBCUTANEOUS at 15:08

## 2023-09-20 RX ADMIN — HALOPERIDOL LACTATE 5 MG: 5 INJECTION, SOLUTION INTRAMUSCULAR at 22:16

## 2023-09-20 RX ADMIN — DICYCLOMINE HYDROCHLORIDE 20 MG: 20 TABLET ORAL at 15:08

## 2023-09-20 RX ADMIN — LORAZEPAM 2 MG: 2 INJECTION INTRAMUSCULAR; INTRAVENOUS at 22:15

## 2023-09-20 RX ADMIN — DEXMEDETOMIDINE HYDROCHLORIDE 0.4 MCG/KG/HR: 400 INJECTION INTRAVENOUS at 20:33

## 2023-09-20 ASSESSMENT — PAIN SCALES - GENERAL: PAINLEVEL_OUTOF10: 0

## 2023-09-20 ASSESSMENT — PAIN - FUNCTIONAL ASSESSMENT: PAIN_FUNCTIONAL_ASSESSMENT: NONE - DENIES PAIN

## 2023-09-20 NOTE — H&P
[address] Peggy Alcazar  [phone]    [address] [place] [address] Adult  Hospitalist Group    History & Physical    Date of service: 9/20/2023    Patient name: Sharla Goodwin  MRN: [id number]  YOB: 1955  Age: 70 y.o. Primary care provider:  Adina Spann MD     Source of Information: patient, medical records and ERP                                  Chief complain: AMS    History of present illness  Sharla Goodwin is a 70 y.o. male who presented with AMS. Patient is alert awake but is confused and cannot provide any history. Patient has a history of baseline dementia and has a psychiatric disorder he is on multiple medications at home. Patient was found down on the floor by the caregiver this morning. Patient is  and lives with his wife but his wife is bedbound as well. As per the wife patient was lying on the floor for almost 1-1/2-hour. Patient does have a history of frequent falls in the past.  When the patient was found he was cold and diaphoretic. Patient is diabetic as well but his blood glucose level was within normal limit. Patient denies any chest pain or shortness of breath. No nausea vomiting diarrhea constipation. His temp was 96.2. Rest of the labs were unremarkable.     Past Medical History:   Diagnosis Date    Anxiety     Arthritis     Asthma     Burning with urination     Depression     Diabetes ([address])     GERD (gastroesophageal reflux disease)     Ill-defined condition     lymphoma  (microscopic)    Memory loss     Psychiatric diagnosis     Generalized Anxiety Disorder    Psychiatric disorder     Anxiety    Psychiatric disorder     Depression      Past Surgical History:   Procedure Laterality Date    CT BIOPSY RENAL  6/24/2021    CT BIOPSY RENAL 6/24/2021 [facility] RAD CT    GI  5122    Fundoplication     HEENT      Tonsillectomy     Prior to Admission medications    Medication Sig Start Date End Date

## 2023-09-20 NOTE — ED PROVIDER NOTES
[facility] EMERGENCY DEPT  EMERGENCY DEPARTMENT ENCOUNTER      Pt Name: Jonatan Rosario  MRN: [id number]  [address] [facility] 1955  Date of evaluation: 9/20/2023  Provider: Oren Alvarez MD    CHIEF COMPLAINT       Chief Complaint   Patient presents with    Fall     al    Altered Mental Status         HISTORY OF PRESENT ILLNESS   (Location/Symptom, Timing/Onset, Context/Setting, Quality, Duration, Modifying Factors, Severity)  Note limiting factors. Patient is a 21-year-old gentleman who is brought in by EMS after being found on the ground. The patient is typically bedbound and was found by caregiver this morning. EMS estimates that the patient was on the ground for approximately an hour and a half. The patient has been confused since he was discovered in the department to the emergency department for additional evaluation. The history is provided by the EMS personnel and the patient. The history is limited by the condition of the patient. Review of External Medical Records:     Nursing Notes were reviewed. REVIEW OF SYSTEMS    (2-9 systems for level 4, 10 or more for level 5)     Review of Systems   Unable to perform ROS: Mental status change       Except as noted above the remainder of the review of systems was reviewed and negative.        PAST MEDICAL HISTORY     Past Medical History:   Diagnosis Date    Anxiety     Arthritis     Asthma     Burning with urination     Depression     Diabetes ([address])     GERD (gastroesophageal reflux disease)     Ill-defined condition     lymphoma  (microscopic)    Memory loss     Psychiatric diagnosis     Generalized Anxiety Disorder    Psychiatric disorder     Anxiety    Psychiatric disorder     Depression         SURGICAL HISTORY       Past Surgical History:   Procedure Laterality Date    CT BIOPSY RENAL  6/24/2021    CT BIOPSY RENAL 6/24/2021 [facility] RAD CT    GI  4820    Fundoplication     HEENT      Tonsillectomy         CURRENT MEDICATIONS       Previous Medications REFLEX TO CULTURE - Abnormal; Notable for the following components:    Blood, Urine TRACE (*)     All other components within normal limits   POCT GLUCOSE - Abnormal; Notable for the following components:    POC Glucose 126 (*)     All other components within normal limits   COVID-19, RAPID   CULTURE, BLOOD 1   CULTURE, BLOOD 2   EXTRA TUBES HOLD   PROCALCITONIN   TROPONIN   CK   POC LACTIC ACID   POCT LACTIC ACID   POCT LACTIC ACID   POCT GLUCOSE       All other labs were within normal range or not returned as of this dictation. EMERGENCY DEPARTMENT COURSE and DIFFERENTIAL DIAGNOSIS/MDM:   Vitals:    Vitals:    09/20/23 1230 09/20/23 1245 09/20/23 1300 09/20/23 1315   BP: 138/83  128/89    Pulse: 71 83 77 83   Resp: 23 15 14 18   Temp:       TempSrc:       SpO2: 100% 100% 100% 100%   Weight:       Height:         Exclusion criteria - the patient is NOT to be included for SEP-1 Core Measure due to: 2+ SIRS criteria are not met       Medical Decision Making  Amount and/or Complexity of Data Reviewed  Labs: ordered. Radiology: ordered. ECG/medicine tests: ordered. Risk  Decision regarding hospitalization. DDX: concussion, delirium, encephalopathy, arrhythmia, STEMI, seizure, meningitis, stroke, sepsis, subarachnoid hemorrhage, intracranial bleeding, encephalitis      REASSESSMENT     ED Course as of 09/20/23 1328   Wed Sep 20, 2023   1117 EKG: Normal sinus rhythm, ventricular rate 81, , QRS 80, QTc 425, artifact present, no STEMI   [KP]      ED Course User Index  [KP] Yelitza Mon, DO       Patient is being admitted to the hospital.  The results of their tests and reasons for their admission have been discussed with them and/or available family. They convey agreement and understanding for the need to be admitted and for their admission diagnosis. Consultation will be made now with the inpatient physician for hospitalization. FINAL IMPRESSION      1. Delirium    2.  Hypothermia, initial

## 2023-09-20 NOTE — PROGRESS NOTES
1900 Bedside shift change report given to Sirena Martinez RN (oncoming nurse) by Roel Shepard RN (offgoing nurse). Report included the following information Nurse Handoff Report, Adult Overview, Surgery Report, Intake/Output, MAR, Med Rec Status, and Cardiac Rhythm NSR .

## 2023-09-20 NOTE — ED NOTES
1601: Care taker of family at bedside. Care taker states that they are at the residence at 1000. They report that the spouse stated the patient was on floor for about 1hr - 1 1/2 hrs. Patient was found in an adult brief only and was on a laminate floor. Patient stated they were sweating and was hot and turned the thermostat down. Care taker states room temperature is typical kept around 76 F.           Chaya Tyson RN  09/20/23 1600

## 2023-09-20 NOTE — ED TRIAGE NOTES
Patient presents from home via EMS- EMS reports a caregiver got to the home today and found the patient on the floor this morning. Patients wife is bed bound and reports the patient had been on the floor for approximately 1.5 hours. Patient was last seen well by a caregiver yesterday afternoon    EMS reports the patient has a history of frequent falls- patient was found cold and diaphoretic. Glucose WNL, patient denies any complaints.      EMS placed a #20 in the left AC-warm IVF given as patient was cold to touch-

## 2023-09-21 ENCOUNTER — APPOINTMENT (OUTPATIENT)
Facility: HOSPITAL | Age: 68
End: 2023-09-21
Payer: MEDICARE

## 2023-09-21 LAB
ALBUMIN SERPL-MCNC: 3.9 G/DL (ref 3.5–5)
ALBUMIN/GLOB SERPL: 1.3 (ref 1.1–2.2)
ALP SERPL-CCNC: 69 U/L (ref 45–117)
ALT SERPL-CCNC: 96 U/L (ref 12–78)
AMPHET UR QL SCN: POSITIVE
ANION GAP SERPL CALC-SCNC: 5 MMOL/L (ref 5–15)
ANION GAP SERPL CALC-SCNC: 6 MMOL/L (ref 5–15)
AST SERPL-CCNC: 52 U/L (ref 15–37)
B PERT DNA SPEC QL NAA+PROBE: NOT DETECTED
BARBITURATES UR QL SCN: NEGATIVE
BASOPHILS # BLD: 0.1 K/UL (ref 0–0.1)
BASOPHILS NFR BLD: 1 % (ref 0–1)
BENZODIAZ UR QL: POSITIVE
BILIRUB SERPL-MCNC: 0.5 MG/DL (ref 0.2–1)
BORDETELLA PARAPERTUSSIS BY PCR: NOT DETECTED
BUN SERPL-MCNC: 26 MG/DL (ref 6–20)
BUN SERPL-MCNC: 31 MG/DL (ref 6–20)
BUN/CREAT SERPL: 21 (ref 12–20)
BUN/CREAT SERPL: 25 (ref 12–20)
C PNEUM DNA SPEC QL NAA+PROBE: NOT DETECTED
CALCIUM SERPL-MCNC: 8.4 MG/DL (ref 8.5–10.1)
CALCIUM SERPL-MCNC: 8.9 MG/DL (ref 8.5–10.1)
CANNABINOIDS UR QL SCN: NEGATIVE
CHLORIDE SERPL-SCNC: 109 MMOL/L (ref 97–108)
CHLORIDE SERPL-SCNC: 110 MMOL/L (ref 97–108)
CO2 SERPL-SCNC: 22 MMOL/L (ref 21–32)
CO2 SERPL-SCNC: 23 MMOL/L (ref 21–32)
COCAINE UR QL SCN: NEGATIVE
CREAT SERPL-MCNC: 1.23 MG/DL (ref 0.7–1.3)
CREAT SERPL-MCNC: 1.24 MG/DL (ref 0.7–1.3)
DIFFERENTIAL METHOD BLD: NORMAL
EOSINOPHIL # BLD: 0.2 K/UL (ref 0–0.4)
EOSINOPHIL NFR BLD: 3 % (ref 0–7)
ERYTHROCYTE [DISTWIDTH] IN BLOOD BY AUTOMATED COUNT: 12.2 % (ref 11.5–14.5)
ERYTHROCYTE [DISTWIDTH] IN BLOOD BY AUTOMATED COUNT: 12.5 % (ref 11.5–14.5)
EST. AVERAGE GLUCOSE BLD GHB EST-MCNC: 148 MG/DL
ETHANOL SERPL-MCNC: <10 MG/DL (ref 0–0.08)
FLUAV SUBTYP SPEC NAA+PROBE: NOT DETECTED
FLUBV RNA SPEC QL NAA+PROBE: NOT DETECTED
GLOBULIN SER CALC-MCNC: 3.1 G/DL (ref 2–4)
GLUCOSE BLD STRIP.AUTO-MCNC: 107 MG/DL (ref 65–117)
GLUCOSE BLD STRIP.AUTO-MCNC: 130 MG/DL (ref 65–117)
GLUCOSE BLD STRIP.AUTO-MCNC: 150 MG/DL (ref 65–117)
GLUCOSE BLD STRIP.AUTO-MCNC: 150 MG/DL (ref 65–117)
GLUCOSE SERPL-MCNC: 142 MG/DL (ref 65–100)
GLUCOSE SERPL-MCNC: 161 MG/DL (ref 65–100)
HADV DNA SPEC QL NAA+PROBE: NOT DETECTED
HBA1C MFR BLD: 6.8 % (ref 4–5.6)
HCOV 229E RNA SPEC QL NAA+PROBE: NOT DETECTED
HCOV HKU1 RNA SPEC QL NAA+PROBE: NOT DETECTED
HCOV NL63 RNA SPEC QL NAA+PROBE: NOT DETECTED
HCOV OC43 RNA SPEC QL NAA+PROBE: NOT DETECTED
HCT VFR BLD AUTO: 41.8 % (ref 36.6–50.3)
HCT VFR BLD AUTO: 45.4 % (ref 36.6–50.3)
HGB BLD-MCNC: 13.7 G/DL (ref 12.1–17)
HGB BLD-MCNC: 14.8 G/DL (ref 12.1–17)
HMPV RNA SPEC QL NAA+PROBE: NOT DETECTED
HPIV1 RNA SPEC QL NAA+PROBE: NOT DETECTED
HPIV2 RNA SPEC QL NAA+PROBE: NOT DETECTED
HPIV3 RNA SPEC QL NAA+PROBE: NOT DETECTED
HPIV4 RNA SPEC QL NAA+PROBE: NOT DETECTED
IMM GRANULOCYTES # BLD AUTO: 0 K/UL (ref 0–0.04)
IMM GRANULOCYTES NFR BLD AUTO: 0 % (ref 0–0.5)
LYMPHOCYTES # BLD: 1.5 K/UL (ref 0.8–3.5)
LYMPHOCYTES NFR BLD: 22 % (ref 12–49)
Lab: ABNORMAL
M PNEUMO DNA SPEC QL NAA+PROBE: NOT DETECTED
MAGNESIUM SERPL-MCNC: 2 MG/DL (ref 1.6–2.4)
MCH RBC QN AUTO: 31.8 PG (ref 26–34)
MCH RBC QN AUTO: 32 PG (ref 26–34)
MCHC RBC AUTO-ENTMCNC: 32.6 G/DL (ref 30–36.5)
MCHC RBC AUTO-ENTMCNC: 32.8 G/DL (ref 30–36.5)
MCV RBC AUTO: 97 FL (ref 80–99)
MCV RBC AUTO: 98.1 FL (ref 80–99)
METHADONE UR QL: NEGATIVE
MONOCYTES # BLD: 0.9 K/UL (ref 0–1)
MONOCYTES NFR BLD: 12 % (ref 5–13)
NEUTS SEG # BLD: 4.4 K/UL (ref 1.8–8)
NEUTS SEG NFR BLD: 62 % (ref 32–75)
NRBC # BLD: 0 K/UL (ref 0–0.01)
NRBC # BLD: 0 K/UL (ref 0–0.01)
NRBC BLD-RTO: 0 PER 100 WBC
NRBC BLD-RTO: 0 PER 100 WBC
OPIATES UR QL: NEGATIVE
PCP UR QL: NEGATIVE
PLATELET # BLD AUTO: 242 K/UL (ref 150–400)
PLATELET # BLD AUTO: 250 K/UL (ref 150–400)
PMV BLD AUTO: 10.6 FL (ref 8.9–12.9)
PMV BLD AUTO: 11.6 FL (ref 8.9–12.9)
POTASSIUM SERPL-SCNC: 4.2 MMOL/L (ref 3.5–5.1)
POTASSIUM SERPL-SCNC: 4.4 MMOL/L (ref 3.5–5.1)
PROT SERPL-MCNC: 7 G/DL (ref 6.4–8.2)
RBC # BLD AUTO: 4.31 M/UL (ref 4.1–5.7)
RBC # BLD AUTO: 4.63 M/UL (ref 4.1–5.7)
RSV RNA SPEC QL NAA+PROBE: NOT DETECTED
RV+EV RNA SPEC QL NAA+PROBE: NOT DETECTED
SARS-COV-2 RNA RESP QL NAA+PROBE: NOT DETECTED
SERVICE CMNT-IMP: ABNORMAL
SERVICE CMNT-IMP: NORMAL
SODIUM SERPL-SCNC: 137 MMOL/L (ref 136–145)
SODIUM SERPL-SCNC: 138 MMOL/L (ref 136–145)
WBC # BLD AUTO: 7.1 K/UL (ref 4.1–11.1)
WBC # BLD AUTO: 7.8 K/UL (ref 4.1–11.1)

## 2023-09-21 PROCEDURE — 82077 ASSAY SPEC XCP UR&BREATH IA: CPT

## 2023-09-21 PROCEDURE — 83036 HEMOGLOBIN GLYCOSYLATED A1C: CPT

## 2023-09-21 PROCEDURE — 99222 1ST HOSP IP/OBS MODERATE 55: CPT | Performed by: NURSE PRACTITIONER

## 2023-09-21 PROCEDURE — 2500000003 HC RX 250 WO HCPCS: Performed by: INTERNAL MEDICINE

## 2023-09-21 PROCEDURE — 0202U NFCT DS 22 TRGT SARS-COV-2: CPT

## 2023-09-21 PROCEDURE — 2580000003 HC RX 258: Performed by: INTERNAL MEDICINE

## 2023-09-21 PROCEDURE — 2580000003 HC RX 258: Performed by: HOSPITALIST

## 2023-09-21 PROCEDURE — 70551 MRI BRAIN STEM W/O DYE: CPT

## 2023-09-21 PROCEDURE — 2060000000 HC ICU INTERMEDIATE R&B

## 2023-09-21 PROCEDURE — 6360000002 HC RX W HCPCS: Performed by: HOSPITALIST

## 2023-09-21 PROCEDURE — 36415 COLL VENOUS BLD VENIPUNCTURE: CPT

## 2023-09-21 PROCEDURE — 6360000002 HC RX W HCPCS: Performed by: STUDENT IN AN ORGANIZED HEALTH CARE EDUCATION/TRAINING PROGRAM

## 2023-09-21 PROCEDURE — 85027 COMPLETE CBC AUTOMATED: CPT

## 2023-09-21 PROCEDURE — 6360000002 HC RX W HCPCS

## 2023-09-21 PROCEDURE — 82962 GLUCOSE BLOOD TEST: CPT

## 2023-09-21 PROCEDURE — 2580000003 HC RX 258: Performed by: STUDENT IN AN ORGANIZED HEALTH CARE EDUCATION/TRAINING PROGRAM

## 2023-09-21 PROCEDURE — 6370000000 HC RX 637 (ALT 250 FOR IP): Performed by: HOSPITALIST

## 2023-09-21 PROCEDURE — 80307 DRUG TEST PRSMV CHEM ANLYZR: CPT

## 2023-09-21 RX ORDER — NOREPINEPHRINE BITARTRATE 0.06 MG/ML
1-100 INJECTION, SOLUTION INTRAVENOUS CONTINUOUS
Status: DISCONTINUED | OUTPATIENT
Start: 2023-09-21 | End: 2023-09-22

## 2023-09-21 RX ORDER — LORAZEPAM 2 MG/ML
2 INJECTION INTRAMUSCULAR ONCE
Status: COMPLETED | OUTPATIENT
Start: 2023-09-21 | End: 2023-09-21

## 2023-09-21 RX ORDER — 0.9 % SODIUM CHLORIDE 0.9 %
500 INTRAVENOUS SOLUTION INTRAVENOUS ONCE
Status: DISCONTINUED | OUTPATIENT
Start: 2023-09-21 | End: 2023-09-23 | Stop reason: HOSPADM

## 2023-09-21 RX ORDER — 0.9 % SODIUM CHLORIDE 0.9 %
500 INTRAVENOUS SOLUTION INTRAVENOUS ONCE
Status: COMPLETED | OUTPATIENT
Start: 2023-09-21 | End: 2023-09-21

## 2023-09-21 RX ORDER — SODIUM CHLORIDE 450 MG/100ML
INJECTION, SOLUTION INTRAVENOUS CONTINUOUS
Status: DISCONTINUED | OUTPATIENT
Start: 2023-09-21 | End: 2023-09-23 | Stop reason: HOSPADM

## 2023-09-21 RX ADMIN — NOREPINEPHRINE BITARTRATE 5 MCG/MIN: 1 INJECTION, SOLUTION, CONCENTRATE INTRAVENOUS at 08:27

## 2023-09-21 RX ADMIN — ROSUVASTATIN CALCIUM 20 MG: 10 TABLET, COATED ORAL at 12:41

## 2023-09-21 RX ADMIN — SODIUM CHLORIDE, PRESERVATIVE FREE 10 ML: 5 INJECTION INTRAVENOUS at 08:52

## 2023-09-21 RX ADMIN — PIPERACILLIN AND TAZOBACTAM 3375 MG: 3; .375 INJECTION, POWDER, LYOPHILIZED, FOR SOLUTION INTRAVENOUS at 20:50

## 2023-09-21 RX ADMIN — LORAZEPAM 2 MG: 2 INJECTION INTRAMUSCULAR; INTRAVENOUS at 04:52

## 2023-09-21 RX ADMIN — ROPINIROLE HYDROCHLORIDE 4 MG: 1 TABLET, FILM COATED ORAL at 20:46

## 2023-09-21 RX ADMIN — BUPROPION HYDROCHLORIDE 150 MG: 150 TABLET, EXTENDED RELEASE ORAL at 12:41

## 2023-09-21 RX ADMIN — SODIUM CHLORIDE: 4.5 INJECTION, SOLUTION INTRAVENOUS at 09:49

## 2023-09-21 RX ADMIN — SODIUM CHLORIDE 500 ML: 9 INJECTION, SOLUTION INTRAVENOUS at 13:38

## 2023-09-21 RX ADMIN — QUETIAPINE FUMARATE 200 MG: 100 TABLET ORAL at 20:47

## 2023-09-21 RX ADMIN — ENOXAPARIN SODIUM 40 MG: 100 INJECTION SUBCUTANEOUS at 08:50

## 2023-09-21 RX ADMIN — DONEPEZIL HYDROCHLORIDE 10 MG: 5 TABLET, FILM COATED ORAL at 20:47

## 2023-09-21 RX ADMIN — BUPROPION HYDROCHLORIDE 150 MG: 150 TABLET, EXTENDED RELEASE ORAL at 20:47

## 2023-09-21 RX ADMIN — SODIUM CHLORIDE, PRESERVATIVE FREE 10 ML: 5 INJECTION INTRAVENOUS at 20:47

## 2023-09-21 RX ADMIN — MEMANTINE 10 MG: 10 TABLET ORAL at 12:41

## 2023-09-21 RX ADMIN — MEMANTINE 10 MG: 10 TABLET ORAL at 20:47

## 2023-09-21 RX ADMIN — PIPERACILLIN AND TAZOBACTAM 4500 MG: 4; .5 INJECTION, POWDER, LYOPHILIZED, FOR SOLUTION INTRAVENOUS at 07:52

## 2023-09-21 ASSESSMENT — PAIN SCALES - GENERAL
PAINLEVEL_OUTOF10: 0
PAINLEVEL_OUTOF10: 0

## 2023-09-21 NOTE — PROGRESS NOTES
[address] [place] [address]  [phone]        Hospitalist Progress Note      NAME: Thierno Rodriges   :  1955  MRM:  [id number]    Date/Time of service: 2023  7:13 AM       Subjective:     Chief Complaint:  Patient was personally seen and examined by me during this time period. Chart reviewed. F/up delirium. Not arousable this morning. He is reacting to touch but not arousable enough to answer questions on precedex gtt. Objective:       Vitals:       Last 24hrs VS reviewed since prior progress note. Most recent are:    Vitals:    23 0700   BP: 90/66   Pulse: 69   Resp: 16   Temp:    SpO2:      SpO2 Readings from Last 6 Encounters:   23 94%          Intake/Output Summary (Last 24 hours) at 2023 9172  Last data filed at 2023 2200  Gross per 24 hour   Intake 11.05 ml   Output --   Net 11.05 ml        Exam:     Physical Exam:    Gen:  in no acute distress  HEENT:  Pink conjunctivae, PERRL, hearing intact to voice, moist mucous membranes  Neck:  Supple, without masses, thyroid non-tender  Resp:  No accessory muscle use, clear breath sounds without wheezes rales or rhonchi  Card:  No murmurs, normal S1, S2 without thrills, bruits or peripheral edema  Abd:  Soft, non-distended, no palpable organomegaly and no detectable hernias  Musc:  No cyanosis or clubbing  Skin:  No rashes or ulcers, skin turgor is good  Neuro:  not following commands.  Responds to touch by grunting  Psych:  N/A      Medications Reviewed: (see below)    Lab Data Reviewed: (see below)    ______________________________________________________________________    Medications:     Current Facility-Administered Medications   Medication Dose Route Frequency    piperacillin-tazobactam (ZOSYN) 4,500 mg in sodium chloride 0.9 % 100 mL IVPB (mini-bag)  4,500 mg IntraVENous Once    Followed by    piperacillin-tazobactam (ZOSYN) 3,375 mg in sodium chloride 0.9

## 2023-09-21 NOTE — PROGRESS NOTES
Delilah thompson called on patient at 200. This rn responded, patient screaming at nursing staff. Upgraded to atlas, security, nursing sup,  all at bedside, several attempts made to deescalate patient, patient continues with aggressive verbal attacks and threats. Patient did attempt to hit and kick staff, 2 mg iv haldol given, slight improvement, restraints applied to patient. Patient still aggressive and verbally abusive, additional haldol 5 mg given iv and this rn started precedex on patient, patient placed on life pack and vitals obtained prior to starting, patient then transported to higher level of care ice bed 8 with security and police, care turned over to icu rn.          Ksenia RRT RN     MEWS 2  SEPSIS 2.48  DI 36

## 2023-09-21 NOTE — PROGRESS NOTES
attended rounds in the ICU as part of the Interdisciplinary team where the patient's ongoing care was discussed. Please contact Spiritual Care for further referrals.     [address] [facility], [address] [facility], [address] [facility]  Staff   Paging service: [phone] (DELILAH)

## 2023-09-21 NOTE — PROGRESS NOTES
Occupational Therapy:  09/21/23    Orders received and appreciated, chart reviewed, spoke to RN. Pt required transfer to ICU overnight due to agitation, now on precedex and unarousable/lethargic, not currently appropriate for therapy intervention. Will continue to follow.      Thank you,  Karen Lazar, OTR/L

## 2023-09-21 NOTE — CONSULTS
drugs. Lab Results (last 24 hrs)  Recent Results (from the past 24 hour(s))   EKG 12 Lead    Collection Time: 09/20/23 11:13 AM   Result Value Ref Range    Ventricular Rate 81 BPM    Atrial Rate 81 BPM    P-R Interval 162 ms    QRS Duration 80 ms    Q-T Interval 366 ms    QTc Calculation (Bazett) 425 ms    P Axis 34 degrees    R Axis 42 degrees    T Axis 79 degrees    Diagnosis       Normal sinus rhythm  Normal ECG  When compared with ECG of 25-JUL-2001 06:35,  Previous ECG has undetermined rhythm, needs review  Confirmed by Rk Scott M.D., Nithya Pack ([id number]) on 9/20/2023 12:21:42 PM     Extra Tubes Hold    Collection Time: 09/20/23 11:41 AM   Result Value Ref Range    Specimen HOld DK GRN,SST,BLUE     Comment:        Add-on orders for these samples will be processed based on acceptable specimen integrity and analyte stability, which may vary by analyte.    CBC with Auto Differential    Collection Time: 09/20/23 11:41 AM   Result Value Ref Range    WBC 7.3 4.1 - 11.1 K/uL    RBC 4.63 4.10 - 5.70 M/uL    Hemoglobin 14.9 12.1 - 17.0 g/dL    Hematocrit 45.2 36.6 - 50.3 %    MCV 97.6 80.0 - 99.0 FL    MCH 32.2 26.0 - 34.0 PG    MCHC 33.0 30.0 - 36.5 g/dL    RDW 12.5 11.5 - 14.5 %    Platelets 257 313 - 657 K/uL    MPV 10.4 8.9 - 12.9 FL    Nucleated RBCs 0.0 0  WBC    nRBC 0.00 0.00 - 0.01 K/uL    Neutrophils % 61 32 - 75 %    Lymphocytes % 21 12 - 49 %    Monocytes % 12 5 - 13 %    Eosinophils % 4 0 - 7 %    Basophils % 1 0 - 1 %    Immature Granulocytes 1 (H) 0.0 - 0.5 %    Neutrophils Absolute 4.5 1.8 - 8.0 K/UL    Lymphocytes Absolute 1.5 0.8 - 3.5 K/UL    Monocytes Absolute 0.9 0.0 - 1.0 K/UL    Eosinophils Absolute 0.3 0.0 - 0.4 K/UL    Basophils Absolute 0.1 0.0 - 0.1 K/UL    Absolute Immature Granulocyte 0.0 0.00 - 0.04 K/UL    Differential Type AUTOMATED     Comprehensive Metabolic Panel    Collection Time: 09/20/23 11:41 AM   Result Value Ref Range    Sodium 138 136 - 145 mmol/L    Potassium 4.1 3.5 Negative NEG      Leukocyte Esterase, Urine Negative NEG      Urine Culture if Indicated CULTURE NOT INDICATED BY UA RESULT CNI      WBC, UA 0-4 0 - 4 /hpf    RBC, UA 0-5 0 - 5 /hpf    Epithelial Cells UA FEW FEW /lpf    BACTERIA, URINE Negative NEG /hpf    Hyaline Casts, UA 0-2 0 - 2 /lpf   Blood Culture 1    Collection Time: 09/20/23  1:00 PM    Specimen: Blood   Result Value Ref Range    Special Requests NO SPECIAL REQUESTS      Culture NO GROWTH AFTER 17 HOURS     POCT Glucose    Collection Time: 09/20/23  2:50 PM   Result Value Ref Range    POC Glucose 121 (H) 65 - 117 mg/dL    Performed by: Priyanka BOYD    POCT Glucose    Collection Time: 09/20/23  6:36 PM   Result Value Ref Range    POC Glucose 144 (H) 65 - 117 mg/dL    Performed by: Anisha Christensen    POCT Glucose    Collection Time: 09/20/23  9:15 PM   Result Value Ref Range    POC Glucose 120 (H) 65 - 117 mg/dL    Performed by: Mono Eric    CBC    Collection Time: 09/21/23  7:49 AM   Result Value Ref Range    WBC 7.8 4.1 - 11.1 K/uL    RBC 4.31 4.10 - 5.70 M/uL    Hemoglobin 13.7 12.1 - 17.0 g/dL    Hematocrit 41.8 36.6 - 50.3 %    MCV 97.0 80.0 - 99.0 FL    MCH 31.8 26.0 - 34.0 PG    MCHC 32.8 30.0 - 36.5 g/dL    RDW 12.2 11.5 - 14.5 %    Platelets 330 341 - 239 K/uL    MPV 10.6 8.9 - 12.9 FL    Nucleated RBCs 0.0 0  WBC    nRBC 0.00 0.00 - 0.01 K/uL   Basic Metabolic Panel    Collection Time: 09/21/23  7:49 AM   Result Value Ref Range    Sodium 138 136 - 145 mmol/L    Potassium 4.2 3.5 - 5.1 mmol/L    Chloride 110 (H) 97 - 108 mmol/L    CO2 23 21 - 32 mmol/L    Anion Gap 5 5 - 15 mmol/L    Glucose 161 (H) 65 - 100 mg/dL    BUN 26 (H) 6 - 20 MG/DL    Creatinine 1.24 0.70 - 1.30 MG/DL    Bun/Cre Ratio 21 (H) 12 - 20      Est, Glom Filt Rate >60 >60 ml/min/1.73m2    Calcium 8.4 (L) 8.5 - 10.1 MG/DL   POCT Glucose    Collection Time: 09/21/23  7:55 AM   Result Value Ref Range    POC Glucose 150 (H) 65 - 117 mg/dL    Performed by: Karla Avery PCT ( ED float Pool)    Ethanol    Collection Time: 09/21/23  8:49 AM   Result Value Ref Range    Ethanol Lvl <10 <10 MG/DL         MEDICAL DECISION MAKING (2 of 3: A +/- B +/- C)     A) Number and Complexity of Problem(s) Addressed:  [] 1 stable, acute illness (Low)  [] 1 stable, chronic illness (Low)  [] 1 acute illness with systemic symptoms (Moderate)  [] 1 undiagnosed new problem with uncertain prognosis (Moderate)  [] 1 or more chronic illness with exacerbation, progression, or side effects of treatment (Moderate)  [] 1 or more chronic illnesses with severe exacerbation, progression, or side effects of treatment (High)  [] 1 acute or chronic illness or injury that poses a threat to life or bodily function (High)     B) Amount/ Complexity of Data reviewed (1 of 3 categories = Moderate, 2 of 3 categories = High)  Test, documents, or independent historian(s) (any THREE ELEMENTS):   []  Review of external note(s) from unique source:  []  Review of the result(s) of each unique test  []  Ordered a unique test  EEG  []  Discussed with a historian other than the patient: Wife     Independent interpretation of a test performed by another Physician / Sherron Carter:                     [] Yes        Discussion of management or test interpretation with another Physician / Sherron Carter:                              [] Yes        C) Risk of Complication, Morbidity, or Mortality:      Escalation of hospital level of care (High Risk):  [] Yes     [] No  Decision to de-escalate care or DNR due to poor prognosis (High Risk):                  [] Yes     [] No  Parenteral controlled substances prescribed (High Risk):                                   [] Yes     [] No  Drug Therapy requiring intensive monitoring for toxicity (High Risk)  [] Yes     [] No  Prescription drug management (Moderate Risk):                                                          [] Yes     [] No  Decision regarding minor surgery (Moderate Risk): [] Yes                     [] No

## 2023-09-21 NOTE — CARE COORDINATION
09/21/23 1356   Service Assessment   Patient Orientation Sedated   Cognition Other (see comment)  (sedated)   History Provided By Medical Record; Other (see comment)  (pts nurse)   Primary [address] is: Named in [address]   (primary decision-maker per AMD:Liyah Hemphill)   PCP Verified by CM Yes  (Dr Franklin Woods)   Last Visit to PCP Within last 6 months   Prior Functional Level Other (see comment)  (full assessment with pt when he is less sedated and able to converse)   Current Functional Level Other (see comment)   Can patient return to prior living arrangement Unknown at present   Ability to make needs known: Rayshawndilan    Family able to assist with home care needs: No   Would you like for me to discuss the discharge plan with any other family members/significant others, and if so, who? No   Financial Resources Medicare   Community Resources Health Education   CM/SW Referral Disease Management Education   Social/Functional History   Lives With Spouse   Type of Home House  (will assess when pt is appropriate)   Home Layout   (will determine once able to converse)   Bathroom Shower/Tub   (TBD)   Occupation Retired   Discharge Planning   [facility] Other (Comment)  (will discuss with pt when more awake)   Potential Assistance Needed Other (Comment)  (TBD)   DME Ordered?  No   Potential Assistance Purchasing Medications No   Type of Home Care Services None   Patient expects to be discharged to: House     Due to pt.'s somnolence ,I will complete the case management admission database when pt is more appropriate and able to converse regarding his disposition,    Dione Day

## 2023-09-21 NOTE — PROGRESS NOTES
Physical Therapy orders acknowledged, chart reviewed and discussed with nurse patient vitals not stable for therapy today advised to defer. We will continue to follow up with the patient for therapy.

## 2023-09-21 NOTE — PROGRESS NOTES
0700 report received from Lena Dai.     1696 BP 44/18 , recheck 49/39. Pt placed in trendelenburg, precedex stopped, Dr. Maranda Yepez notified. 500ml NS bolus started. Pt rousing to voice and agitated when roused, thought will follow commands and is verbally redirectable. Oriented to person, drowsy - drifts to sleep, unable to assess orientation further.     0756 BP 85/69. Bolus still infusing. 8722 BP 71/51 - notified Dr. Maranda Yepez, Levophed ordered - see MAR.     1200 Pt alert, oriented and calm. Passed swallow eval. AM meds given per ICU care team. MRI checklist completed. 1332 BP 77/57 MAP 65. Notified Dr. Maranda Yepez - Per Dr. Maranda Yepez give 500ml NS bolus - restart levo if bolus is not effective. 1645 Pt down to MRI on monitor with sitter. VSS downgraded by Dr. Priscila Godinez to telemetry.

## 2023-09-21 NOTE — FORENSIC NURSE
PRABHAKAR notified of [facility] [place]. PRABHAKAR spoke with Charlette Rodriguez, nursing supervisor regarding safety plan. Advised to call forensics with any issues.

## 2023-09-21 NOTE — PLAN OF CARE
Problem: Skin/Tissue Integrity  Goal: Absence of new skin breakdown  Description: 1. Monitor for areas of redness and/or skin breakdown  2. Assess vascular access sites hourly  3. Every 4-6 hours minimum:  Change oxygen saturation probe site  4. Every 4-6 hours:  If on nasal continuous positive airway pressure, respiratory therapy assess nares and determine need for appliance change or resting period. Outcome: Progressing     Problem: Chronic Conditions and Co-morbidities  Goal: Patient's chronic conditions and co-morbidity symptoms are monitored and maintained or improved  Outcome: Progressing     Problem: Pain  Goal: Verbalizes/displays adequate comfort level or baseline comfort level  Outcome: Progressing     Problem: Confusion  Goal: Confusion, delirium, dementia, or psychosis is improved or at baseline  Description: INTERVENTIONS:  1. Assess for possible contributors to thought disturbance, including medications, impaired vision or hearing, underlying metabolic abnormalities, dehydration, psychiatric diagnoses, and notify attending LIP  2. [place] high risk fall precautions, as indicated  3. Provide frequent short contacts to provide reality reorientation, refocusing and direction  4. Decrease environmental stimuli, including noise as appropriate  5. Monitor and intervene to maintain adequate nutrition, hydration, elimination, sleep and activity  6. If unable to ensure safety without constant attention obtain sitter and review sitter guidelines with assigned personnel  7.  Initiate Psychosocial CNS and Spiritual Care consult, as indicated  Outcome: Progressing

## 2023-09-21 NOTE — PLAN OF CARE
Problem: Safety - Medical Restraint  Goal: Remains free of injury from restraints (Restraint for Interference with Medical Device)  Description: INTERVENTIONS:  1. Determine that other, less restrictive measures have been tried or would not be effective before applying the restraint  2. Evaluate the patient's condition at the time of restraint application  3. Inform patient/family regarding the reason for restraint  4.  Q2H: Monitor safety, psychosocial status, comfort, nutrition and hydration  Outcome: Completed  Flowsheets (Taken 9/20/2023 2100)  Remains free of injury from restraints (restraint for interference with medical device): Every 2 hours: Monitor safety, psychosocial status, comfort, nutrition and hydration
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
## 2023-09-22 LAB
ALBUMIN SERPL-MCNC: 3.2 G/DL (ref 3.5–5)
ALBUMIN/GLOB SERPL: 1 (ref 1.1–2.2)
ALP SERPL-CCNC: 71 U/L (ref 45–117)
ALT SERPL-CCNC: 95 U/L (ref 12–78)
ANION GAP SERPL CALC-SCNC: 3 MMOL/L (ref 5–15)
AST SERPL-CCNC: ABNORMAL U/L (ref 15–37)
BILIRUB SERPL-MCNC: 0.7 MG/DL (ref 0.2–1)
BUN SERPL-MCNC: 14 MG/DL (ref 6–20)
BUN/CREAT SERPL: 12 (ref 12–20)
CALCIUM SERPL-MCNC: 8.5 MG/DL (ref 8.5–10.1)
CHLORIDE SERPL-SCNC: 114 MMOL/L (ref 97–108)
CO2 SERPL-SCNC: 20 MMOL/L (ref 21–32)
CREAT SERPL-MCNC: 1.16 MG/DL (ref 0.7–1.3)
GLOBULIN SER CALC-MCNC: 3.3 G/DL (ref 2–4)
GLUCOSE BLD STRIP.AUTO-MCNC: 115 MG/DL (ref 65–117)
GLUCOSE BLD STRIP.AUTO-MCNC: 126 MG/DL (ref 65–117)
GLUCOSE BLD STRIP.AUTO-MCNC: 142 MG/DL (ref 65–117)
GLUCOSE BLD STRIP.AUTO-MCNC: 155 MG/DL (ref 65–117)
GLUCOSE SERPL-MCNC: 145 MG/DL (ref 65–100)
MAGNESIUM SERPL-MCNC: NORMAL MG/DL (ref 1.6–2.4)
POTASSIUM SERPL-SCNC: ABNORMAL MMOL/L (ref 3.5–5.1)
PROT SERPL-MCNC: 6.5 G/DL (ref 6.4–8.2)
SERVICE CMNT-IMP: ABNORMAL
SERVICE CMNT-IMP: NORMAL
SODIUM SERPL-SCNC: 137 MMOL/L (ref 136–145)

## 2023-09-22 PROCEDURE — 97165 OT EVAL LOW COMPLEX 30 MIN: CPT

## 2023-09-22 PROCEDURE — 6360000002 HC RX W HCPCS: Performed by: HOSPITALIST

## 2023-09-22 PROCEDURE — 83735 ASSAY OF MAGNESIUM: CPT

## 2023-09-22 PROCEDURE — 6360000002 HC RX W HCPCS: Performed by: STUDENT IN AN ORGANIZED HEALTH CARE EDUCATION/TRAINING PROGRAM

## 2023-09-22 PROCEDURE — 97116 GAIT TRAINING THERAPY: CPT

## 2023-09-22 PROCEDURE — 82962 GLUCOSE BLOOD TEST: CPT

## 2023-09-22 PROCEDURE — 2580000003 HC RX 258: Performed by: STUDENT IN AN ORGANIZED HEALTH CARE EDUCATION/TRAINING PROGRAM

## 2023-09-22 PROCEDURE — 1100000000 HC RM PRIVATE

## 2023-09-22 PROCEDURE — 6370000000 HC RX 637 (ALT 250 FOR IP): Performed by: HOSPITALIST

## 2023-09-22 PROCEDURE — 80053 COMPREHEN METABOLIC PANEL: CPT

## 2023-09-22 PROCEDURE — 97535 SELF CARE MNGMENT TRAINING: CPT

## 2023-09-22 PROCEDURE — 97161 PT EVAL LOW COMPLEX 20 MIN: CPT

## 2023-09-22 PROCEDURE — 36415 COLL VENOUS BLD VENIPUNCTURE: CPT

## 2023-09-22 PROCEDURE — 97530 THERAPEUTIC ACTIVITIES: CPT

## 2023-09-22 PROCEDURE — 94761 N-INVAS EAR/PLS OXIMETRY MLT: CPT

## 2023-09-22 PROCEDURE — 2580000003 HC RX 258: Performed by: INTERNAL MEDICINE

## 2023-09-22 RX ADMIN — PANTOPRAZOLE SODIUM 40 MG: 40 TABLET, DELAYED RELEASE ORAL at 06:53

## 2023-09-22 RX ADMIN — QUETIAPINE FUMARATE 200 MG: 100 TABLET ORAL at 20:34

## 2023-09-22 RX ADMIN — DONEPEZIL HYDROCHLORIDE 10 MG: 5 TABLET, FILM COATED ORAL at 20:34

## 2023-09-22 RX ADMIN — SODIUM CHLORIDE: 4.5 INJECTION, SOLUTION INTRAVENOUS at 00:25

## 2023-09-22 RX ADMIN — ROSUVASTATIN CALCIUM 20 MG: 10 TABLET, COATED ORAL at 08:47

## 2023-09-22 RX ADMIN — MEMANTINE 10 MG: 10 TABLET ORAL at 08:47

## 2023-09-22 RX ADMIN — PIPERACILLIN AND TAZOBACTAM 3375 MG: 3; .375 INJECTION, POWDER, LYOPHILIZED, FOR SOLUTION INTRAVENOUS at 05:07

## 2023-09-22 RX ADMIN — BUPROPION HYDROCHLORIDE 150 MG: 150 TABLET, EXTENDED RELEASE ORAL at 08:47

## 2023-09-22 RX ADMIN — ROPINIROLE HYDROCHLORIDE 4 MG: 1 TABLET, FILM COATED ORAL at 20:34

## 2023-09-22 RX ADMIN — MEMANTINE 10 MG: 10 TABLET ORAL at 20:34

## 2023-09-22 RX ADMIN — BUPROPION HYDROCHLORIDE 150 MG: 150 TABLET, EXTENDED RELEASE ORAL at 20:34

## 2023-09-22 ASSESSMENT — PAIN SCALES - GENERAL: PAINLEVEL_OUTOF10: 0

## 2023-09-22 NOTE — PROGRESS NOTES
[address] [place] [address]  [phone]        Hospitalist Progress Note      NAME: Otilia Stanley   :  1955  MRM:  [id number]    Date/Time of service: 2023  7:06 AM       Subjective:     Chief Complaint:  Patient was personally seen and examined by me during this time period. Chart reviewed. F/up delirium. Totally alert and oriented. Has no complaints. Is curious about what happened. Objective:       Vitals:       Last 24hrs VS reviewed since prior progress note. Most recent are:    Vitals:    23 0600   BP: (!) 141/82   Pulse: 93   Resp: 20   Temp:    SpO2: 100%     SpO2 Readings from Last 6 Encounters:   23 100%          Intake/Output Summary (Last 24 hours) at 2023 0706  Last data filed at 2023 0600  Gross per 24 hour   Intake 2398.26 ml   Output 1175 ml   Net 1223.26 ml          Exam:     Physical Exam:    Gen:  in no acute distress  HEENT:  Pink conjunctivae, EOMI, hearing intact to voice, moist mucous membranes  Neck:  Supple, without masses, thyroid non-tender  Resp:  No accessory muscle use, clear breath sounds without wheezes rales or rhonchi  Card:  No murmurs, normal S1, S2 without thrills, bruits or peripheral edema  Abd:  Soft, non-distended, no palpable organomegaly and no detectable hernias  Musc:  No cyanosis or clubbing  Skin:  No rashes or ulcers, skin turgor is good  Neuro: Following commands appropriately. Moving all limbs. No focal deficity  Psych:  alert and oriented.  Good insight      Medications Reviewed: (see below)    Lab Data Reviewed: (see below)    ______________________________________________________________________    Medications:     Current Facility-Administered Medications   Medication Dose Route Frequency    piperacillin-tazobactam (ZOSYN) 3,375 mg in sodium chloride 0.9 % 50 mL IVPB (mini-bag)  3,375 mg IntraVENous Q8H    norepinephrine (LEVOPHED) 16 mg in sodium chloride 0.9 % 250 mL infusion  1-100 mcg/min IntraVENous Continuous    sodium chloride 0.9 % bolus 500 mL  500 mL IntraVENous Once    0.45 % sodium chloride infusion   IntraVENous Continuous    OLANZapine (ZYPREXA) 5 mg in sterile water 1 mL injection  5 mg IntraMUSCular BID PRN    rOPINIRole (REQUIP) tablet 4 mg  4 mg Oral Nightly    QUEtiapine (SEROQUEL) tablet 200 mg  200 mg Oral Nightly    pantoprazole (PROTONIX) tablet 40 mg  40 mg Oral QAM AC    donepezil (ARICEPT) tablet 10 mg  10 mg Oral Nightly    buPROPion (WELLBUTRIN SR) extended release tablet 150 mg  150 mg Oral BID    glucose chewable tablet 16 g  4 tablet Oral PRN    dextrose bolus 10% 125 mL  125 mL IntraVENous PRN    Or    dextrose bolus 10% 250 mL  250 mL IntraVENous PRN    glucagon injection 1 mg  1 mg SubCUTAneous PRN    dextrose 10 % infusion   IntraVENous Continuous PRN    sodium chloride flush 0.9 % injection 5-40 mL  5-40 mL IntraVENous 2 times per day    sodium chloride flush 0.9 % injection 5-40 mL  5-40 mL IntraVENous PRN    0.9 % sodium chloride infusion   IntraVENous PRN    enoxaparin (LOVENOX) injection 40 mg  40 mg SubCUTAneous Daily    ondansetron (ZOFRAN-ODT) disintegrating tablet 4 mg  4 mg Oral Q8H PRN    Or    ondansetron (ZOFRAN) injection 4 mg  4 mg IntraVENous Q6H PRN    polyethylene glycol (GLYCOLAX) packet 17 g  17 g Oral Daily PRN    acetaminophen (TYLENOL) tablet 650 mg  650 mg Oral Q6H PRN    Or    acetaminophen (TYLENOL) suppository 650 mg  650 mg Rectal Q6H PRN    insulin lispro (HUMALOG) injection vial 0-4 Units  0-4 Units SubCUTAneous TID WC    insulin lispro (HUMALOG) injection vial 0-4 Units  0-4 Units SubCUTAneous Nightly    memantine (NAMENDA) tablet 10 mg  10 mg Oral BID    rosuvastatin (CRESTOR) tablet 20 mg  20 mg Oral Daily    dexmedetomidine (PRECEDEX) 400 mcg in sodium chloride 0.9 % 100 mL infusion  0.1-1.5 mcg/kg/hr IntraVENous Continuous    [Held by provider] glipiZIDE (GLUCOTROL) tablet 10 mg  10 mg Oral Dinner Gwenlyn Cowden, MD

## 2023-09-22 NOTE — CARE COORDINATION
9/22/2023  4:21 PM  Care Management Progress Note      ICD-10-CM    1. Delirium  R41.0       2. Hypothermia, initial encounter  T68. XXXA           RUR:  9%  Risk Level: [x]Low []Moderate []High  Value-based purchasing: [x] Yes [] No  Bundle patient: [] Yes [x] No   Specify:     Transition of care plan:  Discharge pending medical clearance. Therapy evaluated pt and reported no home therapy needs. Pt does not require DME at baseline. Psych consulted. Home with resumption of caregiver services - CM called pt's wife for assessment and dispo planning. Pt's wife reported she and pt have a private caregiver (over 10 years), and she feels the care provided is adequate for meeting pt's needs. When caregiver is not there, pt assist with the wife's care (wife has capacity but is physically limited). Pt receives no other community services. Outpatient follow-up. Pt's caregiver to transport. 09/22/23 1530   Service Assessment   Patient Orientation Alert and Oriented   Cognition Alert   History Provided By Significant Other  (Via p/c)   Primary Caregiver Private caregiver   Support Systems Spouse/Significant Other;Home Care Staff   Patient's Healthcare Decision Maker is: Named in [address]   PCP Verified by CM Yes   Last Visit to PCP Within last year   Prior Functional Level Independent in ADLs/IADLs   Current Functional Level Independent in ADLs/IADLs   Can patient return to prior living arrangement Yes   Ability to make needs known: Fair   Family able to assist with home care needs: Other (comment)  (Pt and pt's wife has a private caregiver who is with them 3 hours in the AM, 3 hours in the afternoon, and 2 hours in the PM Sunday-Friday. Pt's helps care for his wife (who has capacity) in between.)   Would you like for me to discuss the discharge plan with any other family members/significant others, and if so, who? Yes  (As needed)   Genna Morgan; Other (Comment)  (Medicare and [employer])   [employer] None   Social/Functional History   Lives With Spouse   Type of [facility]  One level   Home Access Elevator  (Outdoor stair lift.)   Home Equipment None  (Pt uses no DME at baseline.)   [address]   Ambulation Assistance Independent   Transfer Assistance Independent   Active  Yes  (Does not drive often. Caregiver primarily drives.)   Mode of Transportation Car  (Pt's caregiver will provide transport at DC.)   Discharge Planning   Type of Residence House   Living Arrangements Spouse/Significant Other   Current Services Prior To Admission None   DME Ordered?  No   Potential Assistance Purchasing Medications No   Type of Home Care Services None   Patient expects to be discharged to: [facility]   Services At/After Discharge None   Mode of Transport at Discharge Other (see comment)   Confirm Follow Up Transport Family

## 2023-09-22 NOTE — PROGRESS NOTES
Patient moved out of the ICU to the fourth floor, declined his EEG. No other neurologic work-up available recommend psych consult to evaluate psych meds. Please call us back as needed.

## 2023-09-22 NOTE — PLAN OF CARE
Problem: Skin/Tissue Integrity  Goal: Absence of new skin breakdown  Description: 1. Monitor for areas of redness and/or skin breakdown  2. Assess vascular access sites hourly  3. Every 4-6 hours minimum:  Change oxygen saturation probe site  4. Every 4-6 hours:  If on nasal continuous positive airway pressure, respiratory therapy assess nares and determine need for appliance change or resting period. Outcome: Progressing     Problem: Safety - Adult  Goal: Free from fall injury  Outcome: Progressing     Problem: Discharge Planning  Goal: Discharge to home or other facility with appropriate resources  Outcome: Progressing  Flowsheets (Taken 9/22/2023 0800 by Hesham Kirkland RN)  Discharge to home or other facility with appropriate resources: Identify barriers to discharge with patient and caregiver     Problem: Chronic Conditions and Co-morbidities  Goal: Patient's chronic conditions and co-morbidity symptoms are monitored and maintained or improved  Outcome: Progressing  Flowsheets (Taken 9/22/2023 0800 by Hesham Kirkland RN)  Care Plan - Patient's Chronic Conditions and Co-Morbidity Symptoms are Monitored and Maintained or Improved: Monitor and assess patient's chronic conditions and comorbid symptoms for stability, deterioration, or improvement     Problem: Pain  Goal: Verbalizes/displays adequate comfort level or baseline comfort level  Outcome: Progressing  Flowsheets (Taken 9/22/2023 0800 by Hesham Kirkland, RN)  Verbalizes/displays adequate comfort level or baseline comfort level: Encourage patient to monitor pain and request assistance     Problem: Confusion  Goal: Confusion, delirium, dementia, or psychosis is improved or at baseline  Description: INTERVENTIONS:  1. Assess for possible contributors to thought disturbance, including medications, impaired vision or hearing, underlying metabolic abnormalities, dehydration, psychiatric diagnoses, and notify attending LIP  2.  [place] high risk fall precautions, as indicated  3. Provide frequent short contacts to provide reality reorientation, refocusing and direction  4. Decrease environmental stimuli, including noise as appropriate  5. Monitor and intervene to maintain adequate nutrition, hydration, elimination, sleep and activity  6. If unable to ensure safety without constant attention obtain sitter and review sitter guidelines with assigned personnel  7.  Initiate Psychosocial CNS and Spiritual Care consult, as indicated  Outcome: Progressing  Flowsheets (Taken 9/22/2023 0800 by Kristen Velazquez RN)  Effect of thought disturbance (confusion, delirium, dementia, or psychosis) are managed with adequate functional status: Assess for contributors to thought disturbance, including medications, impaired vision or hearing, underlying metabolic abnormalities, dehydration, psychiatric diagnoses, notify LIP     Problem: ABCDS Injury Assessment  Goal: Absence of physical injury  Outcome: Progressing

## 2023-09-22 NOTE — PROGRESS NOTES
Nutrition Note    Corrected diet order    Electronically signed by Doris Billingsley MS RD on 0/55/27 at 8:30 AM EDT    Contact: Ext: [phone], or via [employer]

## 2023-09-22 NOTE — PROGRESS NOTES
0518: Attempted to get AM labs and new PIV since only has one access. Patient stated that he refuses to get blood drawn and wants to be left alone.

## 2023-09-22 NOTE — PROGRESS NOTES
Entered room to place peripheral IV, patient is refusing to be stuck any further. Explained that I have the ultrasound machine and am on the vascular access team, continues to refuse. Nursing made aware.     Ted Tubbs RN

## 2023-09-22 NOTE — PROGRESS NOTES
..0700 Bedside and Verbal shift change report given to Leyda RN (oncoming nurse) by Evangelina Thomason RN (offgoing nurse). Report included the following information SBAR, Cardiac Rhythm NSR, and Dual Neuro Assessment. 0800 Morning Assessment  Neuro A&0 x3  Cardiac NSR  Respiratory RA clear  GI Vegetarian    Urinal  Skin Intact  IV Pulled out IV and refuses further IV access    0800 Assessment completed. VSS Patient refused labs and refused PICC team to assess for line and he refused. 0830 Pt refused IV access. MD made aware and dc IV meds and made PO. Takes PO pills fine. 0900 Pt is calm, following directions, and does not appear to need sitter at this time. 0915 Report called to [facility] 4th floor.

## 2023-09-23 VITALS
HEIGHT: 67 IN | RESPIRATION RATE: 18 BRPM | WEIGHT: 195.77 LBS | HEART RATE: 96 BPM | BODY MASS INDEX: 30.73 KG/M2 | OXYGEN SATURATION: 100 % | DIASTOLIC BLOOD PRESSURE: 94 MMHG | TEMPERATURE: 98.1 F | SYSTOLIC BLOOD PRESSURE: 147 MMHG

## 2023-09-23 PROCEDURE — 94761 N-INVAS EAR/PLS OXIMETRY MLT: CPT

## 2023-09-23 PROCEDURE — 6370000000 HC RX 637 (ALT 250 FOR IP): Performed by: HOSPITALIST

## 2023-09-23 RX ORDER — PANTOPRAZOLE SODIUM 40 MG/1
40 TABLET, DELAYED RELEASE ORAL
Qty: 30 TABLET | Refills: 3 | Status: SHIPPED | OUTPATIENT
Start: 2023-09-24

## 2023-09-23 RX ORDER — QUETIAPINE FUMARATE 200 MG/1
200 TABLET, FILM COATED ORAL NIGHTLY
Qty: 60 TABLET | Refills: 3 | Status: SHIPPED | OUTPATIENT
Start: 2023-09-23 | End: 2023-09-23 | Stop reason: SDUPTHER

## 2023-09-23 RX ORDER — QUETIAPINE FUMARATE 200 MG/1
200 TABLET, FILM COATED ORAL NIGHTLY
Qty: 60 TABLET | Refills: 3 | Status: SHIPPED | OUTPATIENT
Start: 2023-09-23

## 2023-09-23 RX ORDER — PANTOPRAZOLE SODIUM 40 MG/1
40 TABLET, DELAYED RELEASE ORAL
Qty: 30 TABLET | Refills: 3 | Status: SHIPPED | OUTPATIENT
Start: 2023-09-24 | End: 2023-09-23 | Stop reason: SDUPTHER

## 2023-09-23 RX ADMIN — BUPROPION HYDROCHLORIDE 150 MG: 150 TABLET, EXTENDED RELEASE ORAL at 08:58

## 2023-09-23 RX ADMIN — ROSUVASTATIN CALCIUM 20 MG: 10 TABLET, COATED ORAL at 08:58

## 2023-09-23 RX ADMIN — PANTOPRAZOLE SODIUM 40 MG: 40 TABLET, DELAYED RELEASE ORAL at 06:16

## 2023-09-23 RX ADMIN — MEMANTINE 10 MG: 10 TABLET ORAL at 08:58

## 2023-09-23 NOTE — PLAN OF CARE
Problem: Skin/Tissue Integrity  Goal: Absence of new skin breakdown  Description: 1. Monitor for areas of redness and/or skin breakdown  2. Assess vascular access sites hourly  3. Every 4-6 hours minimum:  Change oxygen saturation probe site  4. Every 4-6 hours:  If on nasal continuous positive airway pressure, respiratory therapy assess nares and determine need for appliance change or resting period. 9/23/2023 1213 by Yola Hampton RN  Outcome: Adequate for Discharge  9/23/2023 1043 by Yola Hampton RN  Outcome: Progressing     Problem: Safety - Adult  Goal: Free from fall injury  9/23/2023 1213 by Yola Hampton RN  Outcome: Adequate for Discharge  9/23/2023 1043 by Yola Hampton RN  Outcome: Progressing     Problem: Discharge Planning  Goal: Discharge to home or other facility with appropriate resources  9/23/2023 1213 by Yola Hampton RN  Outcome: Adequate for Discharge  9/23/2023 1043 by Yola Hampton RN  Outcome: Progressing     Problem: Chronic Conditions and Co-morbidities  Goal: Patient's chronic conditions and co-morbidity symptoms are monitored and maintained or improved  9/23/2023 1213 by Yola Hampton RN  Outcome: Adequate for Discharge  9/23/2023 1043 by Yola Hampton RN  Outcome: Progressing     Problem: Pain  Goal: Verbalizes/displays adequate comfort level or baseline comfort level  9/23/2023 1213 by Yola Hampton RN  Outcome: Adequate for Discharge  9/23/2023 1043 by Yola Hampton RN  Outcome: Progressing     Problem: Confusion  Goal: Confusion, delirium, dementia, or psychosis is improved or at baseline  Description: INTERVENTIONS:  1. Assess for possible contributors to thought disturbance, including medications, impaired vision or hearing, underlying metabolic abnormalities, dehydration, psychiatric diagnoses, and notify attending LIP  2. [place] high risk fall precautions, as indicated  3.  Provide frequent short contacts to provide reality reorientation, refocusing and

## 2023-09-23 NOTE — PLAN OF CARE
Problem: Skin/Tissue Integrity  Goal: Absence of new skin breakdown  Description: 1. Monitor for areas of redness and/or skin breakdown  2. Assess vascular access sites hourly  3. Every 4-6 hours minimum:  Change oxygen saturation probe site  4. Every 4-6 hours:  If on nasal continuous positive airway pressure, respiratory therapy assess nares and determine need for appliance change or resting period. Outcome: Progressing     Problem: Safety - Adult  Goal: Free from fall injury  Outcome: Progressing     Problem: Discharge Planning  Goal: Discharge to home or other facility with appropriate resources  Outcome: Progressing     Problem: Chronic Conditions and Co-morbidities  Goal: Patient's chronic conditions and co-morbidity symptoms are monitored and maintained or improved  Outcome: Progressing     Problem: Pain  Goal: Verbalizes/displays adequate comfort level or baseline comfort level  Outcome: Progressing     Problem: Confusion  Goal: Confusion, delirium, dementia, or psychosis is improved or at baseline  Description: INTERVENTIONS:  1. Assess for possible contributors to thought disturbance, including medications, impaired vision or hearing, underlying metabolic abnormalities, dehydration, psychiatric diagnoses, and notify attending LIP  2. [place] high risk fall precautions, as indicated  3. Provide frequent short contacts to provide reality reorientation, refocusing and direction  4. Decrease environmental stimuli, including noise as appropriate  5. Monitor and intervene to maintain adequate nutrition, hydration, elimination, sleep and activity  6. If unable to ensure safety without constant attention obtain sitter and review sitter guidelines with assigned personnel  7.  Initiate Psychosocial CNS and Spiritual Care consult, as indicated  Outcome: Progressing     Problem: ABCDS Injury Assessment  Goal: Absence of physical injury  Outcome: Progressing

## 2023-09-23 NOTE — DISCHARGE INSTRUCTIONS
HOSPITALIST DISCHARGE INSTRUCTIONS  NAME:  Javad Mckeon   :  1955   MRN:  [id number]     Date/Time:  2023 10:25 AM    ADMIT DATE: 2023     DISCHARGE DATE: 2023     DISCHARGE DIAGNOSIS:  Delirium    DISCHARGE INSTRUCTIONS:  Thank you for allowing us to participate in your care. Your discharging Hospitalist is Hermelindo Mccartney MD. Mamta Desir were admitted for evaluation and treatment of the above. You were seen by a neurologist. The consensus is that you were delirious because you were on to many medicines. Your meds were changed. Please take as prescribed. Please follow up with your PCP and neurologist (contact details in this packet). MEDICATIONS:    It is important that you take the medication exactly as they are prescribed. Keep your medication in the bottles provided by the pharmacist and keep a list of the medication names, dosages, and times to be taken in your wallet. Do not take other medications without consulting your doctor. If you experience any of the following symptoms then please call your primary care physician or return to the emergency room if you cannot get hold of your doctor:  Fever, chills, nausea, vomiting, diarrhea, change in mentation, falling, bleeding, shortness of breath    Follow Up:  Please call the below provider to arrange hospital follow up appointment      Carlos Londono, Pablo [facility] [address] [phone]    Schedule an appointment as soon as possible for a visit      Magan Guillen MD  [address] [place] [address]  [phone]    Schedule an appointment as soon as possible for a visit          Information obtained by :  I understand that if any problems occur once I am at home I am to contact my physician. I understand and acknowledge receipt of the instructions indicated above.

## 2023-09-23 NOTE — DISCHARGE SUMMARY
Hospitalist Discharge Summary     Patient ID:  Derrek Marks  [id number]  1 y.o.  1955    Admit date: 9/20/2023    Discharge date and time: 9/23/2023    Admission Diagnoses: Delirium [R41.0]  Hypothermia, initial encounter [T68. XXXA]    Discharge Diagnoses:    Principal Problem:    Delirium  Resolved Problems:    * No resolved hospital problems. *         Hospital Course:   Drerek Marks is an 70 y.o. male with Hx of Dm, BPH, dementia, RLS, HLD, anxiety and depression who is admitted for fall, debility, delirium. Delirium: POA. Acute. Probably related to polypharmacy. Workup neg for possible other cause. Sepsis ruled out. Off precedex gtt. MRI NAP. EEG declined. - PCP and neuro follow up  - Consider psych referral     Dementia / anxiety / depression: POA. Chronic.  - Continue Namenda, Seroquel, effexor, welbutrin     Hypotension: Not POA. Likely d/t precedex gtt. Resolved. SIRS criteria: POA. Acute. SIRS 2/4 (hypother, tachycard) with evidence of end organ dysfunction. UA neg, procal neg, rapid COVID neg, LA neg. BCx collected. Covered empirically at first, then did well off abx. DM2: POA. Chronic. Level of control unknown. - No change to home meds     BPH: POA. Chronic. UOP better  - Continue finasteride and Flomax     Restless leg syndrome: POA. Chronic  - continue ropinirole     Hyperlipidemia: POA. Chronic  - cont statin    Imaging  MRI BRAIN WO CONTRAST    Result Date: 9/21/2023  1. No acute intracranial abnormality. 2. Generalized parenchymal volume loss and mild chronic microvascular ischemic disease, which have slightly progressed since 2015. CT Head W/O Contrast    Result Date: 9/20/2023  No acute abnormality.      XR CHEST PORTABLE    Result Date: 9/20/2023  No acute process on portable chest.        PCP: Latosha Calero MD     Consults: neurology    Condition of patient at discharge: Improved    Discharge Exam:    Physical Exam:     Gen:  in no acute distress  HEENT:  Pink conjunctivae, EOMI, hearing intact to voice, moist mucous membranes  Neck:  Supple, without masses, thyroid non-tender  Resp:  No accessory muscle use, clear breath sounds without wheezes rales or rhonchi  Card:  No murmurs, normal S1, S2 without thrills, bruits or peripheral edema  Abd:  Soft, non-distended, no palpable organomegaly and no detectable hernias  Musc:  No cyanosis or clubbing  Skin:  No rashes or ulcers, skin turgor is good  Neuro: Following commands appropriately. Moving all limbs. No focal deficity  Psych:  alert and oriented.  Good insight          Disposition: home    Patient Instructions:   Discharge Medication List as of 9/23/2023 11:51 AM        CONTINUE these medications which have CHANGED    Details   pantoprazole (PROTONIX) 40 MG tablet Take 1 tablet by mouth every morning (before breakfast), Disp-30 tablet, R-3Normal      QUEtiapine (SEROQUEL) 200 MG tablet Take 1 tablet by mouth nightly, Disp-60 tablet, R-3Normal           CONTINUE these medications which have NOT CHANGED    Details   loratadine (CLARITIN) 10 MG tablet Take 0.5 tablets by mouth in the morning and at bedtimeHistorical Med      glipiZIDE (GLUCOTROL XL) 10 MG extended release tablet Take 1 tablet by mouth Daily with supperHistorical Med      tamsulosin (FLOMAX) 0.4 MG capsule Take 1 capsule by mouth nightlyHistorical Med      metFORMIN (GLUCOPHAGE-XR) 500 MG extended release tablet Take 2 tablets by mouth nightlyHistorical Med      memantine (NAMENDA) 10 MG tablet Take 1 tablet by mouth 2 times dailyHistorical Med      buPROPion (WELLBUTRIN SR) 150 MG extended release tablet Take 1 tablet by mouth 2 times dailyHistorical Med      donepezil (ARICEPT) 10 MG tablet Take 1 tablet by mouth nightlyHistorical Med      rOPINIRole (REQUIP) 1 MG tablet Take 4 tablets by mouth nightlyHistorical Med      rosuvastatin (CRESTOR) 20 MG tablet Take 1 tablet by mouth dailyHistorical Med           STOP taking these medications

## 2023-09-23 NOTE — PROGRESS NOTES
Discharge instructions reviewed with patient. Time allowed to answer questions and address concerns. Paperwork sent home with patient. No IV in place - previously removed and patient refused to have another placed. Patient verbalized understanding of instructions and medications.

## 2023-09-24 LAB
BACTERIA SPEC CULT: NORMAL
BACTERIA SPEC CULT: NORMAL
SERVICE CMNT-IMP: NORMAL
SERVICE CMNT-IMP: NORMAL

## 2023-09-28 NOTE — PROGRESS NOTES
Physician Progress Note      PATIENT:               Portia Velasquez  CSN #:                  [id number]  :                       1955  ADMIT DATE:       2023 11:04 AM  DISCH DATE:        2023 12:23 PM  RESPONDING  PROVIDER #:        Griselda Fare MD          QUERY TEXT:    Good Morning    This patient was admitted on 2023-2023 for Delirium in the setting   of polypharmacy. Per consult note, \"Encephalopathy superimposed on Dementia and psych   disorders. If possible, can you please document in the discharge summary, if known, can   the delirium be further specified as: The medical record reflects the following:  Risk Factors: Dementia, Psych Disorder, Anxiety, Depression  Clinical Indicators: 70 yr old male to ER after being found on the ground. Typically, bedbound Pt alert awake but confused. Upon arrive. Hx of baseline   dementia with psychiatric disorder and on multiple medications at home . Pt   aggressive towards staff. Discharge summary with neuro assessment, notes   \"following commands appropriately. Alert and oriented and good insight. Treatment:  ICU for Precedex, cont. Namenda, Seroquel, Effexor, Wellbutrin ,   Neuro , CT head, Pt refused EEG    Thank you  Debby Conklin, BSN ,Rn, CPHQ, CCDS, SMART  Options provided:  -- Delirium due to polypharmacy  -- Delirium due to progression or exacerbation of Dementia  -- Drug induced encephalopathy due to polypharmacy  -- Other - I will add my own diagnosis  -- Disagree - Not applicable / Not valid  -- Disagree - Clinically unable to determine / Unknown  -- Refer to Clinical Documentation Reviewer    PROVIDER RESPONSE TEXT:    The patient had delirium due to polypharmacy.     Query created by: Debby Conklin on 2023 6:26 AM      Electronically signed by:  Griselda Fare MD 2023 4:39 PM

## 2023-12-28 ENCOUNTER — OFFICE VISIT (OUTPATIENT)
Age: 68
End: 2023-12-28
Payer: MEDICARE

## 2023-12-28 VITALS
DIASTOLIC BLOOD PRESSURE: 90 MMHG | OXYGEN SATURATION: 95 % | WEIGHT: 183.8 LBS | HEART RATE: 96 BPM | SYSTOLIC BLOOD PRESSURE: 138 MMHG | TEMPERATURE: 98.5 F | HEIGHT: 67 IN | BODY MASS INDEX: 28.85 KG/M2 | RESPIRATION RATE: 17 BRPM

## 2023-12-28 DIAGNOSIS — F03.A0 MILD DEMENTIA WITHOUT BEHAVIORAL DISTURBANCE, PSYCHOTIC DISTURBANCE, MOOD DISTURBANCE, OR ANXIETY, UNSPECIFIED DEMENTIA TYPE (HCC): Primary | ICD-10-CM

## 2023-12-28 DIAGNOSIS — Z91.09 ENVIRONMENTAL ALLERGIES: ICD-10-CM

## 2023-12-28 DIAGNOSIS — C85.90 LYMPHOMA, LOW GRADE (HCC): ICD-10-CM

## 2023-12-28 DIAGNOSIS — R32 URINARY INCONTINENCE, UNSPECIFIED TYPE: ICD-10-CM

## 2023-12-28 DIAGNOSIS — E11.9 TYPE 2 DIABETES MELLITUS WITHOUT COMPLICATION, WITHOUT LONG-TERM CURRENT USE OF INSULIN (HCC): ICD-10-CM

## 2023-12-28 DIAGNOSIS — F33.0 MILD EPISODE OF RECURRENT MAJOR DEPRESSIVE DISORDER (HCC): ICD-10-CM

## 2023-12-28 DIAGNOSIS — Z91.81 AT RISK FOR FALLING: ICD-10-CM

## 2023-12-28 LAB
ERYTHROCYTE [DISTWIDTH] IN BLOOD BY AUTOMATED COUNT: 13 % (ref 11.5–14.5)
HCT VFR BLD AUTO: 51.3 % (ref 36.6–50.3)
HGB BLD-MCNC: 17 G/DL (ref 12.1–17)
MCH RBC QN AUTO: 32 PG (ref 26–34)
MCHC RBC AUTO-ENTMCNC: 33.1 G/DL (ref 30–36.5)
MCV RBC AUTO: 96.6 FL (ref 80–99)
NRBC # BLD: 0 K/UL (ref 0–0.01)
NRBC BLD-RTO: 0 PER 100 WBC
PLATELET # BLD AUTO: 258 K/UL (ref 150–400)
PMV BLD AUTO: 10.7 FL (ref 8.9–12.9)
RBC # BLD AUTO: 5.31 M/UL (ref 4.1–5.7)
WBC # BLD AUTO: 8.5 K/UL (ref 4.1–11.1)

## 2023-12-28 PROCEDURE — 99203 OFFICE O/P NEW LOW 30 MIN: CPT

## 2023-12-28 PROCEDURE — 2022F DILAT RTA XM EVC RTNOPTHY: CPT

## 2023-12-28 PROCEDURE — G8484 FLU IMMUNIZE NO ADMIN: HCPCS

## 2023-12-28 PROCEDURE — 1123F ACP DISCUSS/DSCN MKR DOCD: CPT

## 2023-12-28 PROCEDURE — 3044F HG A1C LEVEL LT 7.0%: CPT

## 2023-12-28 PROCEDURE — G8419 CALC BMI OUT NRM PARAM NOF/U: HCPCS

## 2023-12-28 PROCEDURE — G8427 DOCREV CUR MEDS BY ELIG CLIN: HCPCS

## 2023-12-28 PROCEDURE — 3017F COLORECTAL CA SCREEN DOC REV: CPT

## 2023-12-28 PROCEDURE — 4004F PT TOBACCO SCREEN RCVD TLK: CPT

## 2023-12-28 NOTE — PROGRESS NOTES
[facility], [address] [place] [address]   Office [phone], Fax [phone]    Subjective:     Chief Complaint   Patient presents with    [facility]     Fall about 6 weeks ago. HPI:  Ted Guadarrama is a 70 y.o. male with a history of dementia, type 2 diabetes, insomnia, restless legs, environmental allergies, incontinence that presents for:    New Patient: Presenting to [facility]    Social:   Works: Retired,   Lives with: Wife, homebound, cares for her at home. Has a friend who helps with food and groceries. [place] PCP: Dr. Anupama Martinez (retired at the end of last year)  Tobacco: Never  Alcohol: Occasional  Drug Use: No      Medical History:  Dementia:   - Seroquel nightly, 600mg nightly for sleep and irritability   - Namenda 10mg, Aricept 10mg  - Has gotten lost driving in the past   - Manages finances and medications at home    Depression: Difficult to take care of his wife at home. Endorses previous SI, but no attempt. No current SI.   - Wellbutrin 150mg BID     T2DM: Diagnosed years ago, last A1c 6.8 in September  - Glipizide XL 10mg   - Pioglitizone   - Has stopped Metformin due to side effects     HLD:   - Crestor 20mg daily     RLS:   - Requip 1mg daily      Allergies: Environmental  - weekly injections  - Dr. Judith Pickard, allergist      BPH/incontinence:   - Follows with Dr. Chen Modi Read: has seen an oncologist, was told not to worry anymore.    - uncertain of oncologist name    Surgical History:   - Perforated colon in 2017   - Vasectomy          Health Maintenance:  Health Maintenance Due   Topic Date Due    COVID-19 Vaccine (1) Never done    Pneumococcal 65+ years Vaccine (1 - PCV) Never done    Lipids  Never done    Hepatitis C screen  Never done    DTaP/Tdap/Td vaccine (1 - Tdap) Never done    Shingles vaccine (1 of 2) Never done    Colorectal Cancer Screen  Never done    Respiratory Syncytial Virus (RSV) Pregnant

## 2023-12-29 DIAGNOSIS — E11.9 TYPE 2 DIABETES MELLITUS WITHOUT COMPLICATION, WITHOUT LONG-TERM CURRENT USE OF INSULIN (HCC): ICD-10-CM

## 2023-12-29 LAB
ANION GAP SERPL CALC-SCNC: 5 MMOL/L (ref 5–15)
BUN SERPL-MCNC: 23 MG/DL (ref 6–20)
BUN/CREAT SERPL: 16 (ref 12–20)
CALCIUM SERPL-MCNC: 9.6 MG/DL (ref 8.5–10.1)
CHLORIDE SERPL-SCNC: 109 MMOL/L (ref 97–108)
CHOLEST SERPL-MCNC: 144 MG/DL
CO2 SERPL-SCNC: 24 MMOL/L (ref 21–32)
CREAT SERPL-MCNC: 1.41 MG/DL (ref 0.7–1.3)
EST. AVERAGE GLUCOSE BLD GHB EST-MCNC: 163 MG/DL
GLUCOSE SERPL-MCNC: 146 MG/DL (ref 65–100)
HBA1C MFR BLD: 7.3 % (ref 4–5.6)
HDLC SERPL-MCNC: 42 MG/DL
HDLC SERPL: 3.4 (ref 0–5)
LDLC SERPL CALC-MCNC: 65.6 MG/DL (ref 0–100)
POTASSIUM SERPL-SCNC: 4.8 MMOL/L (ref 3.5–5.1)
SODIUM SERPL-SCNC: 138 MMOL/L (ref 136–145)
TRIGL SERPL-MCNC: 182 MG/DL
VLDLC SERPL CALC-MCNC: 36.4 MG/DL

## 2024-01-26 ENCOUNTER — OFFICE VISIT (OUTPATIENT)
Age: 69
End: 2024-01-26
Payer: MEDICARE

## 2024-01-26 VITALS
WEIGHT: 182 LBS | DIASTOLIC BLOOD PRESSURE: 75 MMHG | BODY MASS INDEX: 28.51 KG/M2 | TEMPERATURE: 98 F | SYSTOLIC BLOOD PRESSURE: 121 MMHG | OXYGEN SATURATION: 97 % | HEART RATE: 68 BPM

## 2024-01-26 DIAGNOSIS — F03.A0 MILD DEMENTIA WITHOUT BEHAVIORAL DISTURBANCE, PSYCHOTIC DISTURBANCE, MOOD DISTURBANCE, OR ANXIETY, UNSPECIFIED DEMENTIA TYPE (HCC): ICD-10-CM

## 2024-01-26 DIAGNOSIS — E11.9 TYPE 2 DIABETES MELLITUS WITHOUT COMPLICATION, WITHOUT LONG-TERM CURRENT USE OF INSULIN (HCC): ICD-10-CM

## 2024-01-26 DIAGNOSIS — F33.0 MILD EPISODE OF RECURRENT MAJOR DEPRESSIVE DISORDER (HCC): ICD-10-CM

## 2024-01-26 DIAGNOSIS — Z00.00 MEDICARE ANNUAL WELLNESS VISIT, SUBSEQUENT: ICD-10-CM

## 2024-01-26 DIAGNOSIS — M25.561 CHRONIC PAIN OF RIGHT KNEE: Primary | ICD-10-CM

## 2024-01-26 DIAGNOSIS — G89.29 CHRONIC PAIN OF RIGHT KNEE: Primary | ICD-10-CM

## 2024-01-26 DIAGNOSIS — C85.90 LYMPHOMA, LOW GRADE (HCC): ICD-10-CM

## 2024-01-26 DIAGNOSIS — G47.00 INSOMNIA, UNSPECIFIED TYPE: ICD-10-CM

## 2024-01-26 PROCEDURE — 3046F HEMOGLOBIN A1C LEVEL >9.0%: CPT

## 2024-01-26 PROCEDURE — G0439 PPPS, SUBSEQ VISIT: HCPCS

## 2024-01-26 PROCEDURE — 3017F COLORECTAL CA SCREEN DOC REV: CPT

## 2024-01-26 PROCEDURE — 1123F ACP DISCUSS/DSCN MKR DOCD: CPT

## 2024-01-26 PROCEDURE — G8484 FLU IMMUNIZE NO ADMIN: HCPCS

## 2024-01-26 RX ORDER — TRAZODONE HYDROCHLORIDE 50 MG/1
50 TABLET ORAL NIGHTLY
Qty: 90 TABLET | Refills: 1 | Status: SHIPPED | OUTPATIENT
Start: 2024-01-26 | End: 2024-01-26

## 2024-01-26 RX ORDER — TRAZODONE HYDROCHLORIDE 50 MG/1
50 TABLET ORAL NIGHTLY
Qty: 90 TABLET | Refills: 1 | Status: SHIPPED | OUTPATIENT
Start: 2024-01-26

## 2024-01-26 ASSESSMENT — MINI MENTAL STATE EXAM
SHOW: WRISTWATCH [OBJECT] ASK: WHAT IS THIS CALLED?: 1
SAY: I AM GOING TO NAME THREE OBJECTS. WHEN I AM FINISHED, I WANT YOU TO REPEAT
THEM. REMEMBER WHAT THEY ARE BECAUSE I AM GOING TO ASK YOU TO NAME THEM AGAIN IN
A FEW MINUTES.  SAY THE FOLLOWING WORDS SLOWLY AT 1-SECOND INTERVALS - BALL/ CAR/ MAN [ITERATIONS FOR REPEAT ADMINISTRATION]: 1
WHAT DAY OF THE WEEK IS THIS?: 1
SAY: READ THE WORDS ON THE PAGE AND THEN DO WHAT IT SAYS. THEN HAND THE PERSON
THE SHEET WITH CLOSE YOUR EYES ON IT. IF THE SUBJECT READS AND DOES NOT CLOSE THEIR EYES, REPEAT UP TO THREE TIMES. SCORE ONLY IF SUBJECT CLOSES EYES.: 1
SHOW: PENCIL [OBJECT] ASK: WHAT IS THIS CALLED?: 1
WHICH SEASON IS THIS?: 1
WHAT IS THE NAME OF THIS BUILDING [IN FACILITY]?/WHAT IS THE STREET ADDRESS OF THIS HOUSE [IN HOME]?: 1
SAY: PUT THE PAPER DOWN ON THE FLOOR, SCORE IF PAPER IS PLACED BACK ON FLOOR: 1
SUM ALL MMSE QUESTIONS FOR TOTAL SCORE [OUT OF 30].: 22
SAY: I WOULD LIKE YOU TO REPEAT THIS PHRASE AFTER ME: NO IFS, ANDS, OR BUTS.: 1
WHAT IS TODAY'S DATE?: 1
WHAT CITY/TOWN ARE WE IN?: 1
HAND THE PERSON A PENCIL AND PAPER. SAY: WRITE ANY COMPLETE SENTENCE ON THAT
PIECE OF PAPER. (NOTE: THE SENTENCE MUST MAKE SENSE. IGNORE SPELLING ERRORS): 1
WHAT STATE [OR PROVINCE] ARE WE IN?: 1
ASK THE PERSON IF HE IS RIGHT OR LEFT-HANDED. TAKE A PIECE OF PAPER AND HOLD IT UP IN
FRONT OF THE PERSON. SAY: TAKE THIS PAPER IN YOUR RIGHT/LEFT HAND (WHICHEVER IS NON-
DOMINANT), SCORE IF PAPER IS PICKED UP IN CORRECT HAND.: 1
WHAT YEAR IS THIS?: 1
NOW WHAT WERE THE THREE OBJECTS I ASKED YOU TO REMEMBER?: 1
PLACE DESIGN, ERASER AND PENCIL IN FRONT OF THE PERSON.  SAY:  COPY THIS DESIGN PLEASE.  SHOW: DESIGN. ALLOW: MULTIPLE TRIES. WAIT UNTIL PERSON IS FINISHED AND HANDS IT BACK. SCORE: ONLY FOR DIAGRAM WITH 4-SIDED FIGURE BETWEEN TWO 5-SIDED FIGURES: 1
WHAT MONTH IS THIS?: 1
WHAT FLOOR ARE WE ON [IN FACILITY]?/ WHAT ROOM ARE WE IN [IN HOME]?: 1
SAY: I WOULD LIKE YOU TO COUNT BACKWARD FROM 100 BY SEVENS: 1
SAY: FOLD THE PAPER IN HALF ONCE WITH BOTH HANDS, SCORE IF PAPER IS CORRECTLY FOLDED IN HALF.: 1
WHAT COUNTRY ARE WE IN?: 1

## 2024-01-26 ASSESSMENT — PATIENT HEALTH QUESTIONNAIRE - PHQ9
7. TROUBLE CONCENTRATING ON THINGS, SUCH AS READING THE NEWSPAPER OR WATCHING TELEVISION: 0
5. POOR APPETITE OR OVEREATING: 0
1. LITTLE INTEREST OR PLEASURE IN DOING THINGS: 0
9. THOUGHTS THAT YOU WOULD BE BETTER OFF DEAD, OR OF HURTING YOURSELF: 0
10. IF YOU CHECKED OFF ANY PROBLEMS, HOW DIFFICULT HAVE THESE PROBLEMS MADE IT FOR YOU TO DO YOUR WORK, TAKE CARE OF THINGS AT HOME, OR GET ALONG WITH OTHER PEOPLE: 0
SUM OF ALL RESPONSES TO PHQ QUESTIONS 1-9: 0
8. MOVING OR SPEAKING SO SLOWLY THAT OTHER PEOPLE COULD HAVE NOTICED. OR THE OPPOSITE, BEING SO FIGETY OR RESTLESS THAT YOU HAVE BEEN MOVING AROUND A LOT MORE THAN USUAL: 0
2. FEELING DOWN, DEPRESSED OR HOPELESS: 0
SUM OF ALL RESPONSES TO PHQ9 QUESTIONS 1 & 2: 0
3. TROUBLE FALLING OR STAYING ASLEEP: 0
4. FEELING TIRED OR HAVING LITTLE ENERGY: 0
6. FEELING BAD ABOUT YOURSELF - OR THAT YOU ARE A FAILURE OR HAVE LET YOURSELF OR YOUR FAMILY DOWN: 0
SUM OF ALL RESPONSES TO PHQ QUESTIONS 1-9: 0

## 2024-01-26 NOTE — PATIENT INSTRUCTIONS
Wait for an ambulance. Do not try to drive yourself.  Watch closely for changes in your health, and be sure to contact your doctor if you have any problems.  Where can you learn more?  Go to https://www.LLLer.net/patientEd and enter F075 to learn more about \"A Healthy Heart: Care Instructions.\"  Current as of: June 25, 2023               Content Version: 13.9  © 1206-0111 [employer].   Care instructions adapted under license by [employer]. If you have questions about a medical condition or this instruction, always ask your healthcare professional. [employer] disclaims any warranty or liability for your use of this information.      Personalized Preventive Plan for Harry Bahena - 1/26/2024  Medicare offers a range of preventive health benefits. Some of the tests and screenings are paid in full while other may be subject to a deductible, co-insurance, and/or copay.    Some of these benefits include a comprehensive review of your medical history including lifestyle, illnesses that may run in your family, and various assessments and screenings as appropriate.    After reviewing your medical record and screening and assessments performed today your provider may have ordered immunizations, labs, imaging, and/or referrals for you.  A list of these orders (if applicable) as well as your Preventive Care list are included within your After Visit Summary for your review.    Other Preventive Recommendations:    A preventive eye exam performed by an eye specialist is recommended every 1-2 years to screen for glaucoma; cataracts, macular degeneration, and other eye disorders.  A preventive dental visit is recommended every 6 months.  Try to get at least 150 minutes of exercise per week or 10,000 steps per day on a pedometer .  Order or download the FREE \"Exercise & Physical Activity: Your Everyday Guide\" from [employer] [facility] [employer] [facility]. Call 1-191.641.7311 or search The National

## 2024-02-02 NOTE — PROGRESS NOTES
Chief Complaint   Patient presents with    Annual Exam     Vitals:    01/26/24 1338   BP: 121/75   Pulse: 68   Temp: 98 °F (36.7 °C)   TempSrc: Oral   SpO2: 97%   Weight: 82.6 kg (182 lb)     1. Have you been to the ER, urgent care clinic since your last visit?  Hospitalized since your last visit?No    2. Have you seen or consulted any other health care providers outside of the [facility] since your last visit?  Include any pap smears or colon screening. No    
I discussed the findings, assessment and plan with the resident and agree with the resident's findings and plan as documented in the resident's note.  
Positive Risk Factors identified today.                 General HRA Questions:  Select all that apply: (!) New or Increased Pain  Activity, Diet, and Weight:  On average, how many days per week do you engage in moderate to strenuous exercise (like a brisk walk)?: 7 days  On average, how many minutes do you engage in exercise at this level?: 40 min    Do you eat balanced/healthy meals regularly?: (!) No    Body mass index is 28.51 kg/m².         Safety:  Do you have any tripping hazards - loose or unsecured carpets or rugs?: (!) Yes  Do you have non-slip mats or non-slip surfaces or shower bars or grab bars in your shower or bathtub?: (!) No    ADL's:   Patient reports needing help with:  Select all that apply: (!) Laundry, Housekeeping, Food Preparation, Transportation, Shopping, Taking Medications            Objective   Vitals:    01/26/24 1338   BP: 121/75   Pulse: 68   Temp: 98 °F (36.7 °C)   TempSrc: Oral   SpO2: 97%   Weight: 82.6 kg (182 lb)      Body mass index is 28.51 kg/m².             Allergies   Allergen Reactions    Other      Other reaction(s): Other (see comments)  Dust and Mold- SNEEZING      Prior to Visit Medications    Medication Sig Taking? Authorizing Provider   diclofenac sodium (VOLTAREN) 1 % GEL Apply 4 g topically 4 times daily Yes Raul Blake MD   traZODone (DESYREL) 50 MG tablet Take 1 tablet by mouth nightly Yes Raul Blake MD   Continuous Blood Gluc Sensor (FREESTYLE HIWOT 2 SENSOR) MISC 1 strip by Does not apply route daily Yes Raul Blake MD   QUEtiapine (SEROQUEL) 200 MG tablet Take 1 tablet by mouth nightly Yes Alex Blakely MD   tamsulosin (FLOMAX) 0.4 MG capsule Take 1 capsule by mouth nightly Yes ProviderChaz MD   buPROPion (WELLBUTRIN SR) 150 MG extended release tablet Take 1 tablet by mouth 2 times daily Yes Automatic Reconciliation, Ar   rOPINIRole (REQUIP) 1 MG tablet Take 4 tablets by mouth nightly Patient takes it PRN Yes Automatic Reconciliation, Ar

## 2024-03-12 ENCOUNTER — OFFICE VISIT (OUTPATIENT)
Age: 69
End: 2024-03-12
Payer: MEDICARE

## 2024-03-12 VITALS
HEART RATE: 95 BPM | BODY MASS INDEX: 28.66 KG/M2 | SYSTOLIC BLOOD PRESSURE: 130 MMHG | OXYGEN SATURATION: 97 % | TEMPERATURE: 97.5 F | WEIGHT: 182.6 LBS | HEIGHT: 67 IN | RESPIRATION RATE: 18 BRPM | DIASTOLIC BLOOD PRESSURE: 75 MMHG

## 2024-03-12 DIAGNOSIS — G47.00 INSOMNIA, UNSPECIFIED TYPE: Primary | ICD-10-CM

## 2024-03-12 DIAGNOSIS — F33.1 MODERATE EPISODE OF RECURRENT MAJOR DEPRESSIVE DISORDER (HCC): ICD-10-CM

## 2024-03-12 PROCEDURE — 99214 OFFICE O/P EST MOD 30 MIN: CPT

## 2024-03-12 PROCEDURE — 99213 OFFICE O/P EST LOW 20 MIN: CPT

## 2024-03-12 RX ORDER — TRAZODONE HYDROCHLORIDE 150 MG/1
150 TABLET ORAL NIGHTLY
Qty: 90 TABLET | Refills: 0 | Status: SHIPPED | OUTPATIENT
Start: 2024-03-12 | End: 2024-03-12

## 2024-03-12 RX ORDER — BUPROPION HYDROCHLORIDE 200 MG/1
200 TABLET, EXTENDED RELEASE ORAL 2 TIMES DAILY
Qty: 180 TABLET | Refills: 0 | Status: SHIPPED | OUTPATIENT
Start: 2024-03-12 | End: 2024-03-12

## 2024-03-12 RX ORDER — TRAZODONE HYDROCHLORIDE 150 MG/1
150 TABLET ORAL NIGHTLY
Qty: 90 TABLET | Refills: 0 | Status: SHIPPED | OUTPATIENT
Start: 2024-03-12

## 2024-03-12 RX ORDER — BUPROPION HYDROCHLORIDE 200 MG/1
200 TABLET, EXTENDED RELEASE ORAL 2 TIMES DAILY
Qty: 180 TABLET | Refills: 0 | Status: SHIPPED | OUTPATIENT
Start: 2024-03-12

## 2024-03-12 SDOH — ECONOMIC STABILITY: FOOD INSECURITY: WITHIN THE PAST 12 MONTHS, THE FOOD YOU BOUGHT JUST DIDN'T LAST AND YOU DIDN'T HAVE MONEY TO GET MORE.: PATIENT DECLINED

## 2024-03-12 SDOH — ECONOMIC STABILITY: FOOD INSECURITY: WITHIN THE PAST 12 MONTHS, YOU WORRIED THAT YOUR FOOD WOULD RUN OUT BEFORE YOU GOT MONEY TO BUY MORE.: PATIENT DECLINED

## 2024-03-12 SDOH — ECONOMIC STABILITY: HOUSING INSECURITY
IN THE LAST 12 MONTHS, WAS THERE A TIME WHEN YOU DID NOT HAVE A STEADY PLACE TO SLEEP OR SLEPT IN A SHELTER (INCLUDING NOW)?: PATIENT DECLINED

## 2024-03-12 SDOH — ECONOMIC STABILITY: INCOME INSECURITY: HOW HARD IS IT FOR YOU TO PAY FOR THE VERY BASICS LIKE FOOD, HOUSING, MEDICAL CARE, AND HEATING?: PATIENT DECLINED

## 2024-03-12 ASSESSMENT — ANXIETY QUESTIONNAIRES
6. BECOMING EASILY ANNOYED OR IRRITABLE: 2
5. BEING SO RESTLESS THAT IT IS HARD TO SIT STILL: 2
7. FEELING AFRAID AS IF SOMETHING AWFUL MIGHT HAPPEN: 0
GAD7 TOTAL SCORE: 10
IF YOU CHECKED OFF ANY PROBLEMS ON THIS QUESTIONNAIRE, HOW DIFFICULT HAVE THESE PROBLEMS MADE IT FOR YOU TO DO YOUR WORK, TAKE CARE OF THINGS AT HOME, OR GET ALONG WITH OTHER PEOPLE: VERY DIFFICULT
2. NOT BEING ABLE TO STOP OR CONTROL WORRYING: 1
4. TROUBLE RELAXING: 2
3. WORRYING TOO MUCH ABOUT DIFFERENT THINGS: 1
1. FEELING NERVOUS, ANXIOUS, OR ON EDGE: 2

## 2024-03-12 ASSESSMENT — PATIENT HEALTH QUESTIONNAIRE - PHQ9
9. THOUGHTS THAT YOU WOULD BE BETTER OFF DEAD, OR OF HURTING YOURSELF: 1
10. IF YOU CHECKED OFF ANY PROBLEMS, HOW DIFFICULT HAVE THESE PROBLEMS MADE IT FOR YOU TO DO YOUR WORK, TAKE CARE OF THINGS AT HOME, OR GET ALONG WITH OTHER PEOPLE: 2
SUM OF ALL RESPONSES TO PHQ9 QUESTIONS 1 & 2: 5
1. LITTLE INTEREST OR PLEASURE IN DOING THINGS: 2
2. FEELING DOWN, DEPRESSED OR HOPELESS: 3
SUM OF ALL RESPONSES TO PHQ QUESTIONS 1-9: 14
SUM OF ALL RESPONSES TO PHQ QUESTIONS 1-9: 14
6. FEELING BAD ABOUT YOURSELF - OR THAT YOU ARE A FAILURE OR HAVE LET YOURSELF OR YOUR FAMILY DOWN: 1
5. POOR APPETITE OR OVEREATING: 2
DEPRESSION UNABLE TO ASSESS: PT REFUSES
SUM OF ALL RESPONSES TO PHQ QUESTIONS 1-9: 14
8. MOVING OR SPEAKING SO SLOWLY THAT OTHER PEOPLE COULD HAVE NOTICED. OR THE OPPOSITE, BEING SO FIGETY OR RESTLESS THAT YOU HAVE BEEN MOVING AROUND A LOT MORE THAN USUAL: 2
SUM OF ALL RESPONSES TO PHQ QUESTIONS 1-9: 13
3. TROUBLE FALLING OR STAYING ASLEEP: 3

## 2024-03-12 NOTE — PROGRESS NOTES
Chief Complaint   Patient presents with    Cough     Cold symptoms, coughing (not a lot).     Insomnia    Other     Patient states that his wife passed away in February. Patient is not doing well.      Vitals:    03/12/24 1338   BP: 130/75   Site: Left Upper Arm   Position: Sitting   Cuff Size: Medium Adult   Pulse: 95   Resp: 18   Temp: 97.5 °F (36.4 °C)   TempSrc: Temporal   SpO2: 97%   Weight: 82.8 kg (182 lb 9.6 oz)   Height: 1.702 m (5' 7\")     1. Have you been to the ER, urgent care clinic since your last visit?  Hospitalized since your last visit?No    2. Have you seen or consulted any other health care providers outside of the [facility] since your last visit?  Include any pap smears or colon screening. No

## 2024-03-12 NOTE — PROGRESS NOTES
History of Present Illness:  Harry Bahena is a 68 y.o. male who presents for    #insomnia   - started on Trazodone 50mg LOV but thinks he needs a higher dose  - has been on 150mg in the past   - his wife passed away in February, symps have been worse since   - h/o depression   - not seeing a therapist  - no family or friends in the area      Past Medical History:   Diagnosis Date    Anxiety     Arthritis     Asthma     Burning with urination     Depression     Diabetes (HCC)     GERD (gastroesophageal reflux disease)     Ill-defined condition     lymphoma  (microscopic)    Memory loss     Psychiatric diagnosis     Generalized Anxiety Disorder    Psychiatric disorder     Anxiety    Psychiatric disorder     Depression       Past Surgical History:   Procedure Laterality Date    CT BIOPSY RENAL  6/24/2021    CT BIOPSY RENAL 6/24/2021 [facility] RAD CT    GI  2012    Fundoplication     HEENT      Tonsillectomy       Current Outpatient Medications   Medication Sig Dispense Refill    traZODone (DESYREL) 150 MG tablet Take 1 tablet by mouth nightly 90 tablet 0    buPROPion (WELLBUTRIN SR) 200 MG extended release tablet Take 1 tablet by mouth 2 times daily 180 tablet 0    diclofenac sodium (VOLTAREN) 1 % GEL Apply 4 g topically 4 times daily 150 g 1    Continuous Blood Gluc Sensor (FREESTYLE HIWOT 2 SENSOR) MISC 1 strip by Does not apply route daily 3 each 1    QUEtiapine (SEROQUEL) 200 MG tablet Take 1 tablet by mouth nightly 60 tablet 3    glipiZIDE (GLUCOTROL XL) 10 MG extended release tablet Take 1 tablet by mouth Daily with supper      tamsulosin (FLOMAX) 0.4 MG capsule Take 1 capsule by mouth nightly      memantine (NAMENDA) 10 MG tablet Take 1 tablet by mouth 2 times daily      donepezil (ARICEPT) 10 MG tablet Take 1 tablet by mouth nightly      rOPINIRole (REQUIP) 1 MG tablet Take 4 tablets by mouth nightly Patient takes it PRN      rosuvastatin (CRESTOR) 20 MG tablet Take 1 tablet by mouth daily      loratadine

## 2024-03-26 ENCOUNTER — TELEMEDICINE (OUTPATIENT)
Age: 69
End: 2024-03-26
Payer: MEDICARE

## 2024-03-26 DIAGNOSIS — F51.01 PRIMARY INSOMNIA: Primary | ICD-10-CM

## 2024-03-26 PROCEDURE — 99443 PR PHYS/QHP TELEPHONE EVALUATION 21-30 MIN: CPT

## 2024-03-26 NOTE — PROGRESS NOTES
[facility] Attending Addendum:  Dr. Mohsin Ali, MD,  the patient and I were not physically present during this encounter.  The resident and I are concurrently monitoring the patient care through appropriate telecommunication technology.  I discussed the findings, assessment and plan with the resident and agree with the resident's findings and plan as documented in the resident's note.      Murphy Gutierrez MD        
seen today for insomnia.    Diagnoses and all orders for this visit:    Primary insomnia: Chronic. Patient endorses difficulty with sleep duration. Melatonin with minimal improvement. He does view television at night and was educated on the blue light effect on photoreceptors of the eyes stimulating wakefulness -- he plans to purchase special glasses for this. Could consider trying higher dose of trazodone, but it has a dose-response relationship that works counter to treating insomnia -- lower doses preferred. Alternatively, may try magnesium bisglycinate, the lowest dosage available, as there have been some observational studies to suggest its effectiveness.           Patient informed to follow up: PRN    I AFFIRM this is a Patient Initiated Episode with an Established Patient who has not had a related appointment within my department in the past 7 days or scheduled within the next 24 hours.    Note: not billable if this call serves to triage the patient into an appointment for the relevant concern    Electronically Signed: Mohsin Ali, MD  Case Reviewed with Dr. Gutierrez ( Attending)  Providers location when delivering service: [facility]    Pursuant to the emergency declaration under the [place] Act and the National Emergencies Act, 1135 waiver authority and the Coronavirus Preparedness and Response Supplemental Appropriations Act, this Virtual  Visit was conducted, with patient's consent, to reduce the patient's risk of exposure to COVID-19 and provide continuity of care for an established patient.   History   Patients past medical, surgical and family histories were personally reviewed and updated.      Past Medical History:   Diagnosis Date    Anxiety     Arthritis     Asthma     Burning with urination     Depression     Diabetes (HCC)     GERD (gastroesophageal reflux disease)     Ill-defined condition     lymphoma  (microscopic)    Memory loss     Psychiatric diagnosis     Generalized Anxiety Disorder

## 2024-04-30 ENCOUNTER — OFFICE VISIT (OUTPATIENT)
Age: 69
End: 2024-04-30
Payer: MEDICARE

## 2024-04-30 VITALS
TEMPERATURE: 98.1 F | RESPIRATION RATE: 18 BRPM | WEIGHT: 192 LBS | HEIGHT: 67 IN | DIASTOLIC BLOOD PRESSURE: 88 MMHG | HEART RATE: 86 BPM | BODY MASS INDEX: 30.13 KG/M2 | OXYGEN SATURATION: 96 % | SYSTOLIC BLOOD PRESSURE: 138 MMHG

## 2024-04-30 DIAGNOSIS — Z11.59 ENCOUNTER FOR HEPATITIS C SCREENING TEST FOR LOW RISK PATIENT: ICD-10-CM

## 2024-04-30 DIAGNOSIS — E11.22 TYPE 2 DIABETES MELLITUS WITH CHRONIC KIDNEY DISEASE, WITHOUT LONG-TERM CURRENT USE OF INSULIN, UNSPECIFIED CKD STAGE (HCC): ICD-10-CM

## 2024-04-30 DIAGNOSIS — F03.90 DEMENTIA, UNSPECIFIED DEMENTIA SEVERITY, UNSPECIFIED DEMENTIA TYPE, UNSPECIFIED WHETHER BEHAVIORAL, PSYCHOTIC, OR MOOD DISTURBANCE OR ANXIETY (HCC): Primary | ICD-10-CM

## 2024-04-30 PROCEDURE — G8417 CALC BMI ABV UP PARAM F/U: HCPCS

## 2024-04-30 PROCEDURE — 1123F ACP DISCUSS/DSCN MKR DOCD: CPT

## 2024-04-30 PROCEDURE — 99214 OFFICE O/P EST MOD 30 MIN: CPT

## 2024-04-30 PROCEDURE — G8427 DOCREV CUR MEDS BY ELIG CLIN: HCPCS

## 2024-04-30 RX ORDER — PIOGLITAZONEHYDROCHLORIDE 30 MG/1
TABLET ORAL
COMMUNITY
End: 2024-04-30 | Stop reason: SDUPTHER

## 2024-04-30 RX ORDER — PIOGLITAZONEHYDROCHLORIDE 30 MG/1
30 TABLET ORAL DAILY
Qty: 90 TABLET | Refills: 1 | Status: SHIPPED | OUTPATIENT
Start: 2024-04-30

## 2024-04-30 NOTE — PROGRESS NOTES
Chief Complaint/Subjective:   HPI:  Harry Bahena is a 68 y.o. male that presents for:   Chief Complaint   Patient presents with    Follow-up Chronic Condition     # Memory Loss / Dementia:  - Chronic concern  - Pt expresses that he would like \"more work-up\" done for his dementia  - On review of prior hospitalization in 9/2023 he had a complete work-up performed for delirium including an MRI of his brain revealing generalized parenchymal loss and chronic ischemic changes  - He is F/b Dr. Ahuja (psychiatrist) who manages his multitude of psych meds  - He has not seen a neurologist and is interested in establishing with one    # T2DM:  - Diabetes Meds: Glipizide 10 mg XR daily, Actos 30 mg daily  - Last HbA1c:   Hemoglobin A1C   Date Value Ref Range Status   12/28/2023 7.3 (H) 4.0 - 5.6 % Final     Comment:     (NOTE)  HbA1C Interpretive Ranges  <5.7              Normal  5.7 - 6.4         Consider Prediabetes  >6.5              Consider Diabetes       - Last urine microalbulmin:   No results found for: \"MALBCR\"  - Does the patient check blood sugars regularly? No   - Fasting BG: N/A   - Postprandial BG: N/A  - Patient on Statin: Crestor 20 mg daily  Lab Results   Component Value Date    CHOL 144 12/28/2023    TRIG 182 (H) 12/28/2023    HDL 42 12/28/2023    LDLCALC 65.6 12/28/2023    CHOLHDLRATIO 3.4 12/28/2023     - Patient on ACEi/ARB: No  - Patient on SGLT2i: No  - Exercise: Limited  - Last comprehensive foot exam: Not UTD  - Did patient receive pneumococcal vaccine: Due for vaccine  - Does the patient have a nephrologist: No  - Does the patient have a cardiologist: No  - Seasonal influenza vaccine: Not UTD        ROS:   Elements of ROS in HPI above    Health Maintenance:  Health Maintenance Due   Topic Date Due    COVID-19 Vaccine (1) Never done    Diabetic foot exam  Never done    Diabetic Alb to Cr ratio (uACR) test  Never done    Diabetic retinal exam  Never done    Hepatitis C screen  Never done

## 2024-04-30 NOTE — PATIENT INSTRUCTIONS
The essentials of losing weight and a diabetic lower carbohydrate diet.    Exercise  You should exercise 45- 60 minutes every day.  This reduces the stored energy in your muscles and allows your insulin level to fall.  You can only lose weight when your insulin level is low.  Then you burn stored energy.    2.  Fasting   a.  You should fast every night from 12-14 hours.   b.  You are still using energy at night when you are sleeping and will burn stored energy.   c.  Fasting allows you burn stored energy in your internal organs such as the liver.  This allows the insulin level to drop.   d.  This allows you to start losing weight.    3.  No sugar!   a.  You should try to eliminate sugar from your diet.   b.  First, eliminate sweet drinks including:  sodas and elisa, sports drinks (like Gatorade or Poweraid), sweet tea and lemonade, wine (and beer), fruit juice (contains fruit sugar) and milk (contains milk sugar).   c.  Eliminate sugary cereals, cookies and candies.  No donuts, pastries or coffee cake.   d.  Eat desserts only on special occasions:  family reunions, birthdays, anniversaries, major holidays.  Eat only small desserts!   e.  Start watching labels for foods that have added sugars.    4. Limit starches   a.  Limit starches particularly:  bread, noodles, pasta, crackers and chips, rice, potatoes and fries.   b.  Watch out for starchy vegetables:  corn and peas.   c.  Women can have 30-45 grams of carbs per meal   d.  Men can have 45-60 grams of carbs per meal   e.  One piece of bread has 15-20 grams of carbs   f.  One half cup of oatmeal, corn, rice, peas and cooked pasta have about 15 grams of carbs.    5.  Fruit-special case   a.  Fruit has nutrients we need such as Vitamin C but also contains a lot of fruit sugar (fructose)   b.  Fruit is not a good snack because fructose does not suppress your appetite.   c.  Fruit can cause progression of fatty liver disease which makes weight loss harder   d.  Limit

## 2024-04-30 NOTE — PROGRESS NOTES
Patient has been identified by name and .    Chief Complaint   Patient presents with    Follow-up Chronic Condition       Vitals:    24 1414   BP: 138/88   Site: Right Upper Arm   Position: Sitting   Cuff Size: Medium Adult   Pulse: 86   Resp: 18   Temp: 98.1 °F (36.7 °C)   TempSrc: Oral   SpO2: 96%   Weight: 87.1 kg (192 lb)   Height: 1.702 m (5' 7\")        \"Have you been to the ER, urgent care clinic since your last visit?  Hospitalized since your last visit?\"    NO    “Have you seen or consulted any other health care providers outside of [facility] since your last visit?”    NO

## 2024-05-01 LAB
ANION GAP SERPL CALC-SCNC: 5 MMOL/L (ref 5–15)
BUN SERPL-MCNC: 24 MG/DL (ref 6–20)
BUN/CREAT SERPL: 22 (ref 12–20)
CALCIUM SERPL-MCNC: 9.5 MG/DL (ref 8.5–10.1)
CHLORIDE SERPL-SCNC: 109 MMOL/L (ref 97–108)
CO2 SERPL-SCNC: 29 MMOL/L (ref 21–32)
CREAT SERPL-MCNC: 1.08 MG/DL (ref 0.7–1.3)
CREAT UR-MCNC: 83.1 MG/DL
ERYTHROCYTE [DISTWIDTH] IN BLOOD BY AUTOMATED COUNT: 14.4 % (ref 11.5–14.5)
EST. AVERAGE GLUCOSE BLD GHB EST-MCNC: 126 MG/DL
GLUCOSE SERPL-MCNC: 107 MG/DL (ref 65–100)
HBA1C MFR BLD: 6 % (ref 4–5.6)
HCT VFR BLD AUTO: 47.1 % (ref 36.6–50.3)
HCV AB SER IA-ACNC: 0.04 INDEX
HCV AB SERPL QL IA: NONREACTIVE
HGB BLD-MCNC: 15.3 G/DL (ref 12.1–17)
MCH RBC QN AUTO: 32.2 PG (ref 26–34)
MCHC RBC AUTO-ENTMCNC: 32.5 G/DL (ref 30–36.5)
MCV RBC AUTO: 99.2 FL (ref 80–99)
MICROALBUMIN UR-MCNC: 4.29 MG/DL
MICROALBUMIN/CREAT UR-RTO: 52 MG/G (ref 0–30)
NRBC # BLD: 0 K/UL (ref 0–0.01)
NRBC BLD-RTO: 0 PER 100 WBC
PLATELET # BLD AUTO: 191 K/UL (ref 150–400)
PMV BLD AUTO: 9.9 FL (ref 8.9–12.9)
POTASSIUM SERPL-SCNC: 5 MMOL/L (ref 3.5–5.1)
RBC # BLD AUTO: 4.75 M/UL (ref 4.1–5.7)
SODIUM SERPL-SCNC: 143 MMOL/L (ref 136–145)
VIT B12 SERPL-MCNC: 404 PG/ML (ref 193–986)
WBC # BLD AUTO: 5.8 K/UL (ref 4.1–11.1)

## 2024-05-04 DIAGNOSIS — E11.9 TYPE 2 DIABETES MELLITUS WITHOUT COMPLICATION, WITHOUT LONG-TERM CURRENT USE OF INSULIN (HCC): ICD-10-CM

## 2024-05-24 ENCOUNTER — OFFICE VISIT (OUTPATIENT)
Age: 69
End: 2024-05-24
Payer: MEDICARE

## 2024-05-24 VITALS
DIASTOLIC BLOOD PRESSURE: 82 MMHG | SYSTOLIC BLOOD PRESSURE: 142 MMHG | HEART RATE: 98 BPM | WEIGHT: 192 LBS | BODY MASS INDEX: 30.13 KG/M2 | OXYGEN SATURATION: 98 % | HEIGHT: 67 IN | TEMPERATURE: 98 F | RESPIRATION RATE: 18 BRPM

## 2024-05-24 DIAGNOSIS — R03.0 ELEVATED BLOOD PRESSURE READING: ICD-10-CM

## 2024-05-24 DIAGNOSIS — L81.4 SOLAR LENTIGO: ICD-10-CM

## 2024-05-24 DIAGNOSIS — F51.01 PRIMARY INSOMNIA: Primary | ICD-10-CM

## 2024-05-24 DIAGNOSIS — B35.6 TINEA CRURIS: ICD-10-CM

## 2024-05-24 DIAGNOSIS — R80.9 URINE PROTEIN INCREASED: ICD-10-CM

## 2024-05-24 PROCEDURE — 1036F TOBACCO NON-USER: CPT | Performed by: FAMILY MEDICINE

## 2024-05-24 PROCEDURE — 1123F ACP DISCUSS/DSCN MKR DOCD: CPT

## 2024-05-24 PROCEDURE — G8417 CALC BMI ABV UP PARAM F/U: HCPCS

## 2024-05-24 PROCEDURE — 99214 OFFICE O/P EST MOD 30 MIN: CPT

## 2024-05-24 PROCEDURE — 3017F COLORECTAL CA SCREEN DOC REV: CPT | Performed by: FAMILY MEDICINE

## 2024-05-24 PROCEDURE — G8427 DOCREV CUR MEDS BY ELIG CLIN: HCPCS

## 2024-05-24 RX ORDER — MICONAZOLE NITRATE 20 MG/G
POWDER TOPICAL
Qty: 45 G | Refills: 1 | Status: SHIPPED | OUTPATIENT
Start: 2024-05-24

## 2024-05-24 ASSESSMENT — PATIENT HEALTH QUESTIONNAIRE - PHQ9
2. FEELING DOWN, DEPRESSED OR HOPELESS: NEARLY EVERY DAY
SUM OF ALL RESPONSES TO PHQ QUESTIONS 1-9: 5
SUM OF ALL RESPONSES TO PHQ9 QUESTIONS 1 & 2: 5
1. LITTLE INTEREST OR PLEASURE IN DOING THINGS: MORE THAN HALF THE DAYS

## 2024-05-24 NOTE — PROGRESS NOTES
Harry Bahena is a 68 y.o. male      Chief Complaint   Patient presents with    Sleep Problem     Patient is coming in for sleep issues and follow up on chronic conditions. No other concerns.        \"Have you been to the ER, urgent care clinic since your last visit?  Hospitalized since your last visit?\"    NO    “Have you seen or consulted any other health care providers outside of [facility] since your last visit?”    NO         Vitals:    05/24/24 1429   BP: (!) 142/82   Site: Right Upper Arm   Position: Sitting   Pulse: 98   Resp: 18   Temp: 98 °F (36.7 °C)   TempSrc: Oral   SpO2: 98%   Weight: 87.1 kg (192 lb)   Height: 1.702 m (5' 7\")            Health Maintenance Due   Topic Date Due    COVID-19 Vaccine (1) Never done    Diabetic foot exam  Never done    Diabetic retinal exam  Never done    DTaP/Tdap/Td vaccine (1 - Tdap) Never done    Colorectal Cancer Screen  Never done    Respiratory Syncytial Virus (RSV) Pregnant or age 60 yrs+ (1 - 1-dose 60+ series) Never done    Pneumococcal 65+ years Vaccine (2 of 2 - PPSV23 or PCV20) 12/13/2019    Shingles vaccine (1 of 2) 03/16/2020         Medication Reconciliation completed, changes noted.  Please  Update medication list.    
I saw and evaluated the patient, performing the key elements of the service.  I discussed the findings, assessment and plan with the resident and agree with the resident's findings and plan as documented in the resident's note.  
now. Supportive measures reviewed in detail on prior visit.   2. Solar lentigo: Chronic. To scalp. No concerning lesions. Pt requesting to have a derm evaluation for further cosmetic management.   -     JUANITA -  Jose Molina MD, Dermatology, [place] ([facility])  3. Elevated blood pressure reading: Noted. Elevated microalbinuria which may have been from poorly controlled sugars vs undiagnosed HTN. Recommended initiation of an ACEi/ARB, however, pt opting to monitor blood pressures for now before starting any medication. Recommended he reach back out with a log of his home values and if he sustains > 140 / > 90, start ACEi or ARB.   4. Urine protein increased: As above.   5. Tinea cruris: Chronic, but new to me this visit. Pt reports to having incontinence and see's a urologist regularly (next in mid 6/2024). He previously tried Flomax, Myrbetriq, and vesicare with no significant improvement. Suspect his fungal infection is from notable moisture in his groin region from incontinence. He has tried several creams (\"from my wife\"). Recommended he try desenex powder and consulting his urologist on possibly surgical options for his incontinence.   -     miconazole (DESENEX) 2 % powder; Apply topically 2 times daily., Disp-45 g, R-1, Normal        Follow up:     Follow-up arranged for: Return if symptoms worsen or fail to improve.      Pt was seen AND discussed with Dr. Gutierrez (attending physician).    I have reviewed pertinent labs results and other data. I have discussed the diagnosis with the patient and the intended plan as seen in the above orders. The patient has received an after-visit summary and questions were answered concerning future plans. I have discussed medication side effects and warnings with the patient as well.    Mohsin Ali, MD  Resident [facility]

## 2024-05-30 ENCOUNTER — TELEPHONE (OUTPATIENT)
Age: 69
End: 2024-05-30

## 2024-05-30 NOTE — TELEPHONE ENCOUNTER
Pt dropped off letter requesting:  Systemic script for whole body not just crotch . No prescritions for itching at [facility]. Doesn't think powder will be enough  Itching all over on head , arms , crotch etc    Request script at [facility]   No script for excessive protein in urine at [facility]    Wants our office to call and schedule his appts ( [place] preferred)   For neurology , dermatology and nephrologist   Referrals were put in for neurology and dermatology     No referral sent in for nephrology?    Letter in your box ( dropped off Thursday at 245pm )

## 2024-05-31 NOTE — TELEPHONE ENCOUNTER
This writer attempted to contact pt to receive information re: his designated pharmacy. Pt did not answer phone. A voice message was left.

## 2024-06-02 DIAGNOSIS — M25.561 ACUTE PAIN OF RIGHT KNEE: ICD-10-CM

## 2024-06-03 ENCOUNTER — TELEPHONE (OUTPATIENT)
Age: 69
End: 2024-06-03

## 2024-06-03 RX ORDER — FLUCONAZOLE 100 MG/1
200 TABLET ORAL
Qty: 8 TABLET | Refills: 0 | Status: SHIPPED | OUTPATIENT
Start: 2024-06-03 | End: 2024-06-25

## 2024-06-03 NOTE — TELEPHONE ENCOUNTER
Pt said he left a note at the  last week for Dr. Gonzalez regarding extreme itching, etc. He says \"it's not new, he knows about it\". He is requesting a rx be called in to the pharmacy. Pt states he \"just needs some relief\"     Pt expressed frustration and anger about getting no response regarding his letter. This writer informed him that a [facility] message was sent by Ashli BLAKELY regarding which Pharmacy. Pt became more angry. Stating, that \"all of this information was in the letter that I left at . The letter also included that my computer does not work and I cannot access [facility]\". Pt did manage to state that he would like for an ORAL MEDICATION to be sent to [id number] - [facility] [address] [facility] - P [phone] - F [phone]       Pt communicated that his itching and discomfort is all over his body, including head and face and he cannot put powder on his face, etc. Again he is requesting an ORAL MEDICATION to alleviate symptoms. Pt is requesting this be taken care of today.          [facility] # [id number] - [place], [place] - [address] [facility] - P [phone] - F [phone]       Thanks

## 2024-06-05 NOTE — TELEPHONE ENCOUNTER
This writer attempted to contact pt to inquire about the refill request received and if he is currently taking the medications. Pt did not answer phone; a voice message was left.

## 2024-06-07 ENCOUNTER — TELEPHONE (OUTPATIENT)
Age: 69
End: 2024-06-07

## 2024-06-07 NOTE — TELEPHONE ENCOUNTER
Pt called stating he is still itching on the neck, head, legs, arms, \"crotch\". He is requesting a call back. Pt stated that he can not go through the weekend with the level of itching he is having. Pt rated his level of itching as a \"9 or 10\"    Thanks!

## 2024-06-07 NOTE — TELEPHONE ENCOUNTER
Pt stated that [facility] with urgent delivery is the best option if something is called in. He also stated that he can only be reached by telephone. He does not have access to a computer.

## 2024-06-10 ENCOUNTER — OFFICE VISIT (OUTPATIENT)
Age: 69
End: 2024-06-10
Payer: MEDICARE

## 2024-06-10 VITALS — HEIGHT: 67 IN | BODY MASS INDEX: 30.04 KG/M2 | WEIGHT: 191.4 LBS

## 2024-06-10 DIAGNOSIS — E11.9 TYPE 2 DIABETES MELLITUS WITHOUT COMPLICATION, WITHOUT LONG-TERM CURRENT USE OF INSULIN (HCC): ICD-10-CM

## 2024-06-10 DIAGNOSIS — L29.9 GENERALIZED PRURITUS: Primary | ICD-10-CM

## 2024-06-10 DIAGNOSIS — B35.6 TINEA CRURIS: ICD-10-CM

## 2024-06-10 PROCEDURE — 99213 OFFICE O/P EST LOW 20 MIN: CPT

## 2024-06-10 RX ORDER — FAMOTIDINE 20 MG/1
20 TABLET, FILM COATED ORAL 2 TIMES DAILY
Qty: 60 TABLET | Refills: 1 | Status: SHIPPED | OUTPATIENT
Start: 2024-06-10

## 2024-06-10 RX ORDER — CETIRIZINE HYDROCHLORIDE 10 MG/1
10 TABLET ORAL DAILY
Qty: 30 TABLET | Refills: 1 | Status: SHIPPED | OUTPATIENT
Start: 2024-06-10

## 2024-06-10 RX ORDER — NYSTATIN 100000 U/G
CREAM TOPICAL
Qty: 30 G | Refills: 0 | Status: SHIPPED | OUTPATIENT
Start: 2024-06-10

## 2024-06-10 RX ORDER — CETIRIZINE HYDROCHLORIDE 10 MG/1
10 TABLET ORAL DAILY
Qty: 30 TABLET | Refills: 1 | Status: SHIPPED | OUTPATIENT
Start: 2024-06-10 | End: 2024-06-10

## 2024-06-10 RX ORDER — FAMOTIDINE 20 MG/1
20 TABLET, FILM COATED ORAL 2 TIMES DAILY
Qty: 60 TABLET | Refills: 1 | Status: SHIPPED | OUTPATIENT
Start: 2024-06-10 | End: 2024-06-10

## 2024-06-10 RX ORDER — HYDROXYZINE HYDROCHLORIDE 25 MG/1
25 TABLET, FILM COATED ORAL EVERY 8 HOURS PRN
Qty: 30 TABLET | Refills: 0 | Status: SHIPPED | OUTPATIENT
Start: 2024-06-10 | End: 2024-06-10

## 2024-06-10 RX ORDER — HYDROXYZINE HYDROCHLORIDE 25 MG/1
25 TABLET, FILM COATED ORAL EVERY 8 HOURS PRN
Qty: 30 TABLET | Refills: 0 | Status: SHIPPED | OUTPATIENT
Start: 2024-06-10 | End: 2024-06-20

## 2024-06-10 ASSESSMENT — PATIENT HEALTH QUESTIONNAIRE - PHQ9
SUM OF ALL RESPONSES TO PHQ QUESTIONS 1-9: 10
4. FEELING TIRED OR HAVING LITTLE ENERGY: NEARLY EVERY DAY
2. FEELING DOWN, DEPRESSED OR HOPELESS: SEVERAL DAYS
SUM OF ALL RESPONSES TO PHQ QUESTIONS 1-9: 10
SUM OF ALL RESPONSES TO PHQ QUESTIONS 1-9: 10
10. IF YOU CHECKED OFF ANY PROBLEMS, HOW DIFFICULT HAVE THESE PROBLEMS MADE IT FOR YOU TO DO YOUR WORK, TAKE CARE OF THINGS AT HOME, OR GET ALONG WITH OTHER PEOPLE: VERY DIFFICULT
9. THOUGHTS THAT YOU WOULD BE BETTER OFF DEAD, OR OF HURTING YOURSELF: NOT AT ALL
SUM OF ALL RESPONSES TO PHQ9 QUESTIONS 1 & 2: 2
SUM OF ALL RESPONSES TO PHQ QUESTIONS 1-9: 10
3. TROUBLE FALLING OR STAYING ASLEEP: SEVERAL DAYS
6. FEELING BAD ABOUT YOURSELF - OR THAT YOU ARE A FAILURE OR HAVE LET YOURSELF OR YOUR FAMILY DOWN: NOT AT ALL
1. LITTLE INTEREST OR PLEASURE IN DOING THINGS: SEVERAL DAYS
5. POOR APPETITE OR OVEREATING: MORE THAN HALF THE DAYS
7. TROUBLE CONCENTRATING ON THINGS, SUCH AS READING THE NEWSPAPER OR WATCHING TELEVISION: MORE THAN HALF THE DAYS

## 2024-06-10 ASSESSMENT — ANXIETY QUESTIONNAIRES
IF YOU CHECKED OFF ANY PROBLEMS ON THIS QUESTIONNAIRE, HOW DIFFICULT HAVE THESE PROBLEMS MADE IT FOR YOU TO DO YOUR WORK, TAKE CARE OF THINGS AT HOME, OR GET ALONG WITH OTHER PEOPLE: VERY DIFFICULT
3. WORRYING TOO MUCH ABOUT DIFFERENT THINGS: SEVERAL DAYS
1. FEELING NERVOUS, ANXIOUS, OR ON EDGE: SEVERAL DAYS
5. BEING SO RESTLESS THAT IT IS HARD TO SIT STILL: NEARLY EVERY DAY
6. BECOMING EASILY ANNOYED OR IRRITABLE: MORE THAN HALF THE DAYS
7. FEELING AFRAID AS IF SOMETHING AWFUL MIGHT HAPPEN: MORE THAN HALF THE DAYS
2. NOT BEING ABLE TO STOP OR CONTROL WORRYING: SEVERAL DAYS
4. TROUBLE RELAXING: SEVERAL DAYS
GAD7 TOTAL SCORE: 11

## 2024-06-10 NOTE — PROGRESS NOTES
I discussed the patient's history and physical exam with the resident. I reviewed the assessment and plan and agree with the resident's documentation.     
Pt here today for itchy skin x2 wks. Pt has tried otc crm with no relief.    Identified pt with two pt identifiers(name and ). Reviewed record in preparation for visit and have obtained necessary documentation.  No chief complaint on file.       Vitals:    06/10/24 1055   Weight: 86.8 kg (191 lb 6.4 oz)   Height: 1.702 m (5' 7\")         Coordination of Care Questionnaire:  :     \"Have you been to the ER, urgent care clinic since your last visit?  Hospitalized since your last visit?\"    NO    “Have you seen or consulted any other health care providers outside of [facility] since your last visit?”    NO        “Have you had a colorectal cancer screening such as a colonoscopy/FIT/Cologuard?    NO    No colonoscopy on file  No cologuard on file  No FIT/FOBT on file   No flexible sigmoidoscopy on file         Click Here for Release of Records Request   
a 68 y.o. male who presents to clinic for evaluation of generalized itching for 2 weeks. He has tried over the counter cream with no relief.  He has not witnessed any skin changes or rashes.  He has itching on the top of his head, abdomen, upper thighs, and elsewhere.  He denies pruritus exacerbated by warm water such as showers.  He finished a course of fluconazole with concern for fungal etiology, but he reports no improvement in the symptoms.  He has severe seasonal allergies and takes regular allergy shots, which she started 15 years ago.  Only other associated symptom includes cold intolerance.    Health Maintenance:  Health Maintenance Due   Topic Date Due    COVID-19 Vaccine (1) Never done    Diabetic foot exam  Never done    Diabetic retinal exam  Never done    DTaP/Tdap/Td vaccine (1 - Tdap) Never done    Colorectal Cancer Screen  Never done    Respiratory Syncytial Virus (RSV) Pregnant or age 60 yrs+ (1 - 1-dose 60+ series) Never done    Pneumococcal 65+ years Vaccine (2 of 2 - PPSV23 or PCV20) 12/13/2019    Shingles vaccine (1 of 2) 03/16/2020      ROS:   Patient otherwise denies fevers, chills, headaches, changes in vision, changes in hearing, chest pain, palpitations, shortness of breath, abdominal pain, nausea, vomiting, diarrhea, constipation, lower extremity edema, and numbness and tingling in extremities.     Past medical history, social history, and medications personally reviewed.  Past Medical History:   Diagnosis Date    Anxiety     Arthritis     Asthma     Burning with urination     Depression     Diabetes (HCC)     GERD (gastroesophageal reflux disease)     Ill-defined condition     lymphoma  (microscopic)    Memory loss     Psychiatric diagnosis     Generalized Anxiety Disorder    Psychiatric disorder     Anxiety    Psychiatric disorder     Depression        Allergies personally reviewed.  Allergies   Allergen Reactions    Other      Other reaction(s): Other (see comments)  Dust and Mold-

## 2024-06-11 LAB
ALBUMIN SERPL-MCNC: 4.1 G/DL (ref 3.5–5)
ALBUMIN/GLOB SERPL: 1.3 (ref 1.1–2.2)
ALP SERPL-CCNC: 75 U/L (ref 45–117)
ALT SERPL-CCNC: 32 U/L (ref 12–78)
ANION GAP SERPL CALC-SCNC: 4 MMOL/L (ref 5–15)
AST SERPL-CCNC: 23 U/L (ref 15–37)
BASOPHILS # BLD: 0.1 K/UL (ref 0–0.1)
BASOPHILS NFR BLD: 1 % (ref 0–1)
BILIRUB SERPL-MCNC: 0.6 MG/DL (ref 0.2–1)
BUN SERPL-MCNC: 24 MG/DL (ref 6–20)
BUN/CREAT SERPL: 21 (ref 12–20)
CALCIUM SERPL-MCNC: 9.7 MG/DL (ref 8.5–10.1)
CHLORIDE SERPL-SCNC: 109 MMOL/L (ref 97–108)
CO2 SERPL-SCNC: 26 MMOL/L (ref 21–32)
CREAT SERPL-MCNC: 1.14 MG/DL (ref 0.7–1.3)
DIFFERENTIAL METHOD BLD: ABNORMAL
EOSINOPHIL # BLD: 0.2 K/UL (ref 0–0.4)
EOSINOPHIL NFR BLD: 3 % (ref 0–7)
ERYTHROCYTE [DISTWIDTH] IN BLOOD BY AUTOMATED COUNT: 14 % (ref 11.5–14.5)
GLOBULIN SER CALC-MCNC: 3.1 G/DL (ref 2–4)
GLUCOSE SERPL-MCNC: 135 MG/DL (ref 65–100)
HCT VFR BLD AUTO: 47.3 % (ref 36.6–50.3)
HGB BLD-MCNC: 15.2 G/DL (ref 12.1–17)
IMM GRANULOCYTES # BLD AUTO: 0 K/UL (ref 0–0.04)
IMM GRANULOCYTES NFR BLD AUTO: 0 % (ref 0–0.5)
LYMPHOCYTES # BLD: 1.3 K/UL (ref 0.8–3.5)
LYMPHOCYTES NFR BLD: 22 % (ref 12–49)
MCH RBC QN AUTO: 32.4 PG (ref 26–34)
MCHC RBC AUTO-ENTMCNC: 32.1 G/DL (ref 30–36.5)
MCV RBC AUTO: 100.9 FL (ref 80–99)
MONOCYTES # BLD: 0.5 K/UL (ref 0–1)
MONOCYTES NFR BLD: 9 % (ref 5–13)
NEUTS SEG # BLD: 3.9 K/UL (ref 1.8–8)
NEUTS SEG NFR BLD: 65 % (ref 32–75)
NRBC # BLD: 0 K/UL (ref 0–0.01)
NRBC BLD-RTO: 0 PER 100 WBC
PLATELET # BLD AUTO: 231 K/UL (ref 150–400)
PMV BLD AUTO: 10.3 FL (ref 8.9–12.9)
POTASSIUM SERPL-SCNC: 4.7 MMOL/L (ref 3.5–5.1)
PROT SERPL-MCNC: 7.2 G/DL (ref 6.4–8.2)
RBC # BLD AUTO: 4.69 M/UL (ref 4.1–5.7)
SODIUM SERPL-SCNC: 139 MMOL/L (ref 136–145)
TSH SERPL DL<=0.05 MIU/L-ACNC: 1.57 UIU/ML (ref 0.36–3.74)
WBC # BLD AUTO: 6 K/UL (ref 4.1–11.1)

## 2024-06-12 RX ORDER — BUPROPION HYDROCHLORIDE 200 MG/1
TABLET, EXTENDED RELEASE ORAL
Refills: 0 | OUTPATIENT
Start: 2024-06-12

## 2024-06-12 RX ORDER — NAPROXEN 250 MG/1
TABLET ORAL
Refills: 0 | OUTPATIENT
Start: 2024-06-12

## 2024-06-13 ENCOUNTER — TELEPHONE (OUTPATIENT)
Age: 69
End: 2024-06-13

## 2024-06-13 NOTE — TELEPHONE ENCOUNTER
Jacqui with [employer] [facility] request that question #1\"Risk vs benefit\" on the PA form for   hydrOXYzine HCl (ATARAX) 25 MG tablet  be completed. She states that the case ends on the 6-16-24        Thanks!

## 2024-06-24 ENCOUNTER — OFFICE VISIT (OUTPATIENT)
Age: 69
End: 2024-06-24
Payer: MEDICARE

## 2024-06-24 VITALS
DIASTOLIC BLOOD PRESSURE: 85 MMHG | TEMPERATURE: 97.9 F | BODY MASS INDEX: 30.23 KG/M2 | SYSTOLIC BLOOD PRESSURE: 142 MMHG | OXYGEN SATURATION: 98 % | WEIGHT: 193 LBS

## 2024-06-24 DIAGNOSIS — J20.9 ACUTE BRONCHITIS, UNSPECIFIED ORGANISM: Primary | ICD-10-CM

## 2024-06-24 DIAGNOSIS — Z76.0 MEDICATION REFILL: ICD-10-CM

## 2024-06-24 PROCEDURE — G8417 CALC BMI ABV UP PARAM F/U: HCPCS

## 2024-06-24 PROCEDURE — 1036F TOBACCO NON-USER: CPT

## 2024-06-24 PROCEDURE — 99213 OFFICE O/P EST LOW 20 MIN: CPT

## 2024-06-24 PROCEDURE — 3017F COLORECTAL CA SCREEN DOC REV: CPT

## 2024-06-24 PROCEDURE — 1123F ACP DISCUSS/DSCN MKR DOCD: CPT

## 2024-06-24 PROCEDURE — G8427 DOCREV CUR MEDS BY ELIG CLIN: HCPCS

## 2024-06-24 RX ORDER — LIDOCAINE 50 MG/G
1 PATCH TOPICAL DAILY
Qty: 30 PATCH | Refills: 0 | Status: SHIPPED | OUTPATIENT
Start: 2024-06-24 | End: 2024-07-24

## 2024-06-24 RX ORDER — ALBUTEROL SULFATE 90 UG/1
2 AEROSOL, METERED RESPIRATORY (INHALATION) 4 TIMES DAILY PRN
Qty: 18 G | Refills: 0 | Status: SHIPPED | OUTPATIENT
Start: 2024-06-24

## 2024-06-24 RX ORDER — BENZONATATE 100 MG/1
100 CAPSULE ORAL 3 TIMES DAILY PRN
Qty: 30 CAPSULE | Refills: 0 | Status: SHIPPED | OUTPATIENT
Start: 2024-06-24 | End: 2024-07-04

## 2024-06-24 NOTE — PROGRESS NOTES
I saw and evaluated the patient, performing the key elements of the service.  I discussed the findings, assessment and plan with the resident and agree with the resident's findings and plan as documented in the resident's note.     No

## 2024-06-24 NOTE — PROGRESS NOTES
Identified pt with two pt identifiers(name and ). Reviewed record in preparation for visit and have obtained necessary documentation.  Chief Complaint   Patient presents with    Cough     On [facility]in [facility]  - not improving         Vitals:    24 1301   BP: (!) 142/85   Temp: 97.9 °F (36.6 °C)   TempSrc: Oral   SpO2: 98%   Weight: 87.5 kg (193 lb)       Coordination of Care Questionnaire:  :     \"Have you been to the ER, urgent care clinic since your last visit?  Hospitalized since your last visit?\"    NO    “Have you seen or consulted any other health care providers outside of [facility] since your last visit?”    NO        “Have you had a colorectal cancer screening such as a colonoscopy/FIT/Cologuard?    NO    No colonoscopy on file  No cologuard on file  No FIT/FOBT on file   No flexible sigmoidoscopy on file         Click Here for Release of Records Request

## 2024-06-24 NOTE — PROGRESS NOTES
Chief Complaint/Subjective:   HPI:  Harry Bahena is a 68 y.o. male that presents for:   Chief Complaint   Patient presents with    Cough     On augmentin right now [facility]  - not improving        # Cough:  - Onset: 6/21, 3 days ago  - Context: Pt was recently seen by a physician at [facility] on 6/21 for a cough and was subsequently prescribed Augmentin along with a 5 day burst of prednisone for ?CAP vs ?Bronchitis. They did not perform a CXR, but he is expected to have a chest CT in 7/2024. He remains with a non-productive cough that tends to worsen on expiration. Of note pt was prescribed bactrim for a \"bladder infection\" recently by his urologist -- completed the course.   - Denies: SOB at rest, CP, fever, chills, abd pain, n/v/d, diaphoresis      ROS:   Elements of ROS in HPI above    Health Maintenance:  Health Maintenance Due   Topic Date Due    COVID-19 Vaccine (1) Never done    Diabetic foot exam  Never done    Diabetic retinal exam  Never done    DTaP/Tdap/Td vaccine (1 - Tdap) Never done    Colorectal Cancer Screen  Never done    Respiratory Syncytial Virus (RSV) Pregnant or age 60 yrs+ (1 - 1-dose 60+ series) Never done    Pneumococcal 65+ years Vaccine (2 of 2 - PPSV23 or PCV20) 12/13/2019    Shingles vaccine (1 of 2) 03/16/2020        Past medical history, social history, and medications personally reviewed.  Past Medical History:   Diagnosis Date    Anxiety     Arthritis     Asthma     Burning with urination     Depression     Diabetes (HCC)     GERD (gastroesophageal reflux disease)     Ill-defined condition     lymphoma  (microscopic)    Memory loss     Psychiatric diagnosis     Generalized Anxiety Disorder    Psychiatric disorder     Anxiety    Psychiatric disorder     Depression       Allergies personally reviewed.  Allergies   Allergen Reactions    Other      Other reaction(s): Other (see comments)  Dust and Mold- SNEEZING

## 2024-06-25 ENCOUNTER — PATIENT MESSAGE (OUTPATIENT)
Age: 69
End: 2024-06-25

## 2024-06-27 ENCOUNTER — CLINICAL DOCUMENTATION (OUTPATIENT)
Age: 69
End: 2024-06-27

## 2024-06-27 NOTE — PROGRESS NOTES
Attempted to call patient regarding an \"important matter\" -- could not reach him.      Mohsin Ali, MD

## 2024-06-28 ENCOUNTER — PATIENT MESSAGE (OUTPATIENT)
Age: 69
End: 2024-06-28

## 2024-06-28 NOTE — TELEPHONE ENCOUNTER
Dr Gonzalez said he referred him to Dr Espinoza ( appt set for on 08/01/2024)  Again Dr Gutierrez is not accepting new patients   Advised today per Dr Gonzalez

## 2024-07-03 DIAGNOSIS — J20.9 ACUTE BRONCHITIS, UNSPECIFIED ORGANISM: ICD-10-CM

## 2024-07-03 RX ORDER — ALBUTEROL SULFATE 90 UG/1
2 AEROSOL, METERED RESPIRATORY (INHALATION) 4 TIMES DAILY
Qty: 3 EACH | Refills: 3 | Status: SHIPPED | OUTPATIENT
Start: 2024-07-03

## 2024-07-09 ENCOUNTER — TELEPHONE (OUTPATIENT)
Age: 69
End: 2024-07-09

## 2024-07-09 NOTE — TELEPHONE ENCOUNTER
----- Message from Mohsin Ali, MD sent at 6/28/2024  5:49 PM EDT -----  Regarding: FW: ECC Message to Provider  Hi,    I was able to reach Mr. Bahena and his urgent request was simply to see if he could establish care with Dr. Gutierrez. I informed him that Dr. Gutierrez's panel is closed to new patients. However, he remained adamant, so I'm Cc'ing Dr. Gutierrez to this message per his request. He is still amenable to keeping his appt with Dr. Espinoza on 8/1, but from what I can gather, he prefers to have a singular continuity such as an attending manage his care.        Thanks,  Mohsin Ali  ----- Message -----  From: Skye Duque LPN  Sent: 6/28/2024   8:14 AM EDT  To: Mohsin Ali, MD  Subject: FW: ECC Message to Provider                      I sent him message via portal to see what issue pertains to  Aware today your last day and you advised you would try to get with him today   ----- Message -----  From: Ali, Mohsin, MD  Sent: 6/27/2024   3:13 PM EDT  To: Mari Segundo; Skye Duque LPN  Subject: RE: ECC Message to Provider                      Attempted to contact patient -- could not get through as I think he's on a land line and I tend to have difficult time getting through.    If he could leave a message what this is regarding, I'd be happy to take care of it by tomorrow.   ----- Message -----  From: Skye Duque LPN  Sent: 6/26/2024   7:58 AM EDT  To: Mohsin Ali, MD  Subject: FW: ECC Message to Provider                        ----- Message -----  From: Jose Alston, COREEN  Sent: 6/25/2024   5:05 PM EDT  To: Mohsin Ali, MD; Skye Duque LPN  Subject: FW: ECC Message to Provider                        ----- Message -----  From: Darrick Burch  Sent: 6/25/2024   4:53 PM EDT  To: [facility] Clinical Staff  Subject: ECC Message to Provider                          ECC Message to Provider    Relationship to Patient: Self     Additional Information : Pt would like to talk

## 2024-08-01 ENCOUNTER — OFFICE VISIT (OUTPATIENT)
Age: 69
End: 2024-08-01
Payer: MEDICARE

## 2024-08-01 VITALS
RESPIRATION RATE: 16 BRPM | SYSTOLIC BLOOD PRESSURE: 138 MMHG | OXYGEN SATURATION: 98 % | DIASTOLIC BLOOD PRESSURE: 80 MMHG | HEART RATE: 86 BPM

## 2024-08-01 DIAGNOSIS — N18.2 TYPE 2 DIABETES MELLITUS WITH STAGE 2 CHRONIC KIDNEY DISEASE, WITHOUT LONG-TERM CURRENT USE OF INSULIN (HCC): ICD-10-CM

## 2024-08-01 DIAGNOSIS — I10 PRIMARY HYPERTENSION: Primary | ICD-10-CM

## 2024-08-01 DIAGNOSIS — R06.5 MOUTH BREATHING: ICD-10-CM

## 2024-08-01 DIAGNOSIS — E11.22 TYPE 2 DIABETES MELLITUS WITH STAGE 2 CHRONIC KIDNEY DISEASE, WITHOUT LONG-TERM CURRENT USE OF INSULIN (HCC): ICD-10-CM

## 2024-08-01 DIAGNOSIS — R80.9 MICROALBUMINURIA: ICD-10-CM

## 2024-08-01 DIAGNOSIS — G25.81 RLS (RESTLESS LEGS SYNDROME): ICD-10-CM

## 2024-08-01 DIAGNOSIS — L29.9 GENERALIZED PRURITUS: ICD-10-CM

## 2024-08-01 PROCEDURE — 99214 OFFICE O/P EST MOD 30 MIN: CPT

## 2024-08-01 RX ORDER — ROPINIROLE 1 MG/1
4 TABLET, FILM COATED ORAL NIGHTLY
Qty: 90 TABLET | Refills: 3 | Status: SHIPPED | OUTPATIENT
Start: 2024-08-01

## 2024-08-01 RX ORDER — LOSARTAN POTASSIUM 25 MG/1
25 TABLET ORAL DAILY
Qty: 90 TABLET | Refills: 1 | Status: SHIPPED | OUTPATIENT
Start: 2024-08-01

## 2024-08-01 RX ORDER — CETIRIZINE HYDROCHLORIDE 10 MG/1
10 TABLET ORAL DAILY
Qty: 90 TABLET | Refills: 3 | Status: SHIPPED | OUTPATIENT
Start: 2024-08-01

## 2024-08-01 NOTE — PROGRESS NOTES
[address] [place] [address]   Office [phone], Fax [phone]      Chief Complaint:     Harry Bahena is a 68 y.o. male that presents for:   Chief Complaint   Patient presents with    Hypertension       Subjective:   HPI:    #HTN  T2DM: Noted 4/2024 to have elevated urine microalbumin/cr. High bp readings x2 in clinic. Agreeable to start losartan.     Requests refill of requip for RLS. Uses zyrtec for chronic generalized itching, requests refill. Has had issues getting freestyle coby, requests hard script to get cheapest option at pharmacy.    Past medical history, social history, medications, and allergies personally reviewed.  Past Medical History:   Diagnosis Date    Anxiety     Arthritis     Asthma     Burning with urination     Depression     Diabetes (HCC)     GERD (gastroesophageal reflux disease)     Ill-defined condition     lymphoma  (microscopic)    Memory loss     Psychiatric diagnosis     Generalized Anxiety Disorder    Psychiatric disorder     Anxiety    Psychiatric disorder     Depression        Social Hx:   Social History     Socioeconomic History    Marital status:    Tobacco Use    Smoking status: Never    Smokeless tobacco: Never   Substance and Sexual Activity    Alcohol use: Yes    Drug use: No     Social Determinants of Health     Financial Resource Strain: Patient Declined (3/12/2024)    Overall Financial Resource Strain (CARDIA)     Difficulty of Paying Living Expenses: Patient declined   Food Insecurity: Patient Declined (3/12/2024)    Hunger Vital Sign     Worried About Running Out of Food in the Last Year: Patient declined     Ran Out of Food in the Last Year: Patient declined   Transportation Needs: Unknown (3/12/2024)    PRAPARE - Transportation     Lack of Transportation (Non-Medical): Patient declined   Physical Activity: Sufficiently Active (1/26/2024)    Exercise Vital Sign     Days of Exercise per Week: 7 days     Minutes of Exercise per

## 2024-08-03 NOTE — PROGRESS NOTES
I saw and evaluated the patient, performing the key elements of the service on the date of service.  I discussed the findings, assessment and plan with the resident and agree with the resident's findings and plan as documented in the resident's note.     Jyoti Singh MD 8/3/2024

## 2024-08-26 ENCOUNTER — OFFICE VISIT (OUTPATIENT)
Age: 69
End: 2024-08-26
Payer: MEDICARE

## 2024-08-26 VITALS
HEART RATE: 97 BPM | WEIGHT: 198.2 LBS | HEIGHT: 67 IN | OXYGEN SATURATION: 96 % | SYSTOLIC BLOOD PRESSURE: 128 MMHG | BODY MASS INDEX: 31.11 KG/M2 | DIASTOLIC BLOOD PRESSURE: 83 MMHG

## 2024-08-26 DIAGNOSIS — L29.9 GENERALIZED PRURITUS: ICD-10-CM

## 2024-08-26 DIAGNOSIS — G47.33 OSA (OBSTRUCTIVE SLEEP APNEA): Primary | ICD-10-CM

## 2024-08-26 DIAGNOSIS — I10 PRIMARY HYPERTENSION: ICD-10-CM

## 2024-08-26 PROCEDURE — 3079F DIAST BP 80-89 MM HG: CPT | Performed by: INTERNAL MEDICINE

## 2024-08-26 PROCEDURE — G8417 CALC BMI ABV UP PARAM F/U: HCPCS | Performed by: INTERNAL MEDICINE

## 2024-08-26 PROCEDURE — 99204 OFFICE O/P NEW MOD 45 MIN: CPT | Performed by: INTERNAL MEDICINE

## 2024-08-26 PROCEDURE — 1123F ACP DISCUSS/DSCN MKR DOCD: CPT | Performed by: INTERNAL MEDICINE

## 2024-08-26 PROCEDURE — G8427 DOCREV CUR MEDS BY ELIG CLIN: HCPCS | Performed by: INTERNAL MEDICINE

## 2024-08-26 PROCEDURE — 3074F SYST BP LT 130 MM HG: CPT | Performed by: INTERNAL MEDICINE

## 2024-08-26 PROCEDURE — 3017F COLORECTAL CA SCREEN DOC REV: CPT | Performed by: INTERNAL MEDICINE

## 2024-08-26 PROCEDURE — 1036F TOBACCO NON-USER: CPT | Performed by: INTERNAL MEDICINE

## 2024-08-26 ASSESSMENT — SLEEP AND FATIGUE QUESTIONNAIRES
HOW LIKELY ARE YOU TO NOD OFF OR FALL ASLEEP WHILE LYING DOWN TO REST IN THE AFTERNOON WHEN CIRCUMSTANCES PERMIT: MODERATE CHANCE OF DOZING
HOW LIKELY ARE YOU TO NOD OFF OR FALL ASLEEP WHEN YOU ARE A PASSENGER IN A CAR FOR AN HOUR WITHOUT A BREAK: WOULD NEVER DOZE
HOW LIKELY ARE YOU TO NOD OFF OR FALL ASLEEP WHILE SITTING INACTIVE IN A PUBLIC PLACE: WOULD NEVER DOZE
ESS TOTAL SCORE: 3
HOW LIKELY ARE YOU TO NOD OFF OR FALL ASLEEP IN A CAR, WHILE STOPPED FOR A FEW MINUTES IN TRAFFIC: WOULD NEVER DOZE
HOW LIKELY ARE YOU TO NOD OFF OR FALL ASLEEP WHILE SITTING AND READING: WOULD NEVER DOZE
HOW LIKELY ARE YOU TO NOD OFF OR FALL ASLEEP WHILE SITTING QUIETLY AFTER LUNCH WITHOUT ALCOHOL: WOULD NEVER DOZE
HOW LIKELY ARE YOU TO NOD OFF OR FALL ASLEEP WHILE WATCHING TV: SLIGHT CHANCE OF DOZING
HOW LIKELY ARE YOU TO NOD OFF OR FALL ASLEEP WHILE SITTING AND TALKING TO SOMEONE: WOULD NEVER DOZE

## 2024-08-26 NOTE — PROGRESS NOTES
[address] [place], [place] [address]  Tel.  [phone]    Fax. [phone]     [address] [place], [place] [address]  Tel.  [phone]    Fax. [phone] [address] [place] [address]  Tel.  [phone]    Fax. [phone]       Harry Bahena is a 68 y.o. year old male referred by Dr. Ashish Espinoza  for evaluation of a sleep disorder.       ASSESSMENT/PLAN:     Diagnosis Orders   1. ROYA (obstructive sleep apnea)  SLEEP STUDY FULL      2. Primary hypertension        3. BMI 31.0-31.9,adult            Patient has a history and examination consistent with the diagnosis of sleep apnea.    Return for follow-up in 1 year or as needed.    * The patient currently has a High Risk for having sleep apnea.  STOP-BANG score 6.    * Sleep testing was ordered for initial evaluation.      Orders Placed This Encounter   Procedures    SLEEP STUDY FULL     Standing Status:   Future     Standing Expiration Date:   8/26/2025       * He was provided information on sleep apnea including corresponding risk factors and the importance of proper treatment.     * Treatment options were reviewed in detail, he would like to proceed with PAP therapy. Patient will be seen in follow-up in 6-8 weeks after PAP setup to gauge treatment response and adherence to therapy.     * The patient was counseled regarding proper sleep hygiene, with emphasis on ensuring sufficient total sleep time; safe driving and the benefits of exercise and weight loss.      * All of his questions were addressed.    2. Hypertension -  continue on current regimen, he will continue to monitor his BP and follow up with his primary care provider for reevaluation/adjustment of medications if warranted.  I have reviewed the relationship between hypertension as it relates to sleep-disordered breathing.    3. Recommended a dedicated weight loss program  Systems:  Constitutional:  No significant weight loss or weight gain  Eyes:  + blurred vision  CVS:  No significant chest pain  Pulm:  No significant shortness of breath  GI:  No significant nausea or vomiting  :  + significant nocturia -uses urinal which is kept at bedside  Musculoskeletal:  + significant joint pain at night  Skin:  No significant rashes  Neuro:  No significant dizziness   Psych:  No active mood issues    Sleep Review of Systems: notable for Positive difficulty falling asleep; Positive awakenings at night; Positive perceived regular dreaming; Positive nightmares; Positive  early morning headaches; Positive  memory problems; Positive  concentration issues; Negative caffeine;  Negative alcohol;   Negative history of any automobile or occupational accidents due to daytime drowsiness.      /83 (Site: Left Lower Arm, Position: Sitting, Cuff Size: Medium Adult)   Pulse 97   Ht 1.702 m (5' 7\")   Wt 89.9 kg (198 lb 3.2 oz)   SpO2 96%   BMI 31.04 kg/m²         General:   Alert, oriented, not in acute distress   Eyes:  Anicteric Sclerae; intact EOM's   Nose:  No obvious nasal septum deviation    Oropharynx:   Mallampati score 4, thick tongue base, uvula not seen due to low-lying soft palate, narrow tonsilo-pharyngeal pilars, tongue scalloped   Neck:   midline trachea,  no JVD   Chest/Lungs:  symmetrical lung expansion ,clear lung fields on auscultation    CVS:  Normal rate, regular rhythm    Extremities:  No obvious rashes, absent edema    Neuro:  No focal deficits; No obvious tremor    Psych:  Normal eye contact; normal  affect, normal countenance          DOMINGO STERN MD, FAASM  Diplomate American Board of Sleep Medicine  Diplomate in Sleep Medicine - ABP    Electronically signed. 08/26/24

## 2024-08-27 RX ORDER — HYDROXYZINE HYDROCHLORIDE 25 MG/1
TABLET, FILM COATED ORAL
Qty: 30 TABLET | Refills: 35 | OUTPATIENT
Start: 2024-08-27

## 2024-08-27 NOTE — TELEPHONE ENCOUNTER
Medication Refill Request    Harry Bahena is requesting a refill of the following medication(s):   Requested Prescriptions     Pending Prescriptions Disp Refills    hydrOXYzine HCl (ATARAX) 25 MG tablet [Pharmacy Med Name: HYDROXYZINE HCL TABS 25MG] 30 tablet 35     Sig: TAKE 1 TABLET EVERY 8 HOURS AS NEEDED FOR ITCHING        Listed PCP is Fidel Real MD   Last provider to prescribe medication: Dr. Real  Last Date of Medication Prescribed: 06/10/2024   Date of Last Office Visit at [facility]: 0801/2024     Future Appointment:   Future Appointments   Date Time Provider Department Center   8/29/2024  2:00 PM Ashish Espinoza MD [facility] ECC [facility]   11/21/2024  8:30 PM BEDROOM [address] [place] [address] Sleep Lab      Please send refill to:    [id number] - [place], [place] - [address] [facility] - P [phone] - F [phone]  [address] [place] [address]  Phone: [phone] Fax: [phone]    [facility] [place], [place] - [address] [place] [address] - P [phone] - F [phone]  [address] [place] [address]  Phone: [phone] Fax: [phone]    [facility] - [place], [place] - [address] [facility] - P [phone] - F [phone]  [address] [facility] [address]  Phone: [phone] Fax: [phone]

## 2024-08-28 DIAGNOSIS — L29.9 GENERALIZED PRURITUS: ICD-10-CM

## 2024-08-29 ENCOUNTER — TELEPHONE (OUTPATIENT)
Age: 69
End: 2024-08-29

## 2024-08-29 ENCOUNTER — ANCILLARY PROCEDURE (OUTPATIENT)
Age: 69
End: 2024-08-29
Payer: MEDICARE

## 2024-08-29 ENCOUNTER — OFFICE VISIT (OUTPATIENT)
Age: 69
End: 2024-08-29
Payer: MEDICARE

## 2024-08-29 VITALS
WEIGHT: 198.2 LBS | RESPIRATION RATE: 20 BRPM | OXYGEN SATURATION: 96 % | HEART RATE: 84 BPM | HEIGHT: 67 IN | BODY MASS INDEX: 31.11 KG/M2 | TEMPERATURE: 97.6 F

## 2024-08-29 DIAGNOSIS — E11.22 TYPE 2 DIABETES MELLITUS WITH STAGE 2 CHRONIC KIDNEY DISEASE, WITHOUT LONG-TERM CURRENT USE OF INSULIN (HCC): ICD-10-CM

## 2024-08-29 DIAGNOSIS — R05.3 CHRONIC COUGH: Primary | ICD-10-CM

## 2024-08-29 DIAGNOSIS — L29.9 GENERALIZED PRURITUS: ICD-10-CM

## 2024-08-29 DIAGNOSIS — I10 PRIMARY HYPERTENSION: ICD-10-CM

## 2024-08-29 DIAGNOSIS — J20.9 ACUTE BRONCHITIS, UNSPECIFIED ORGANISM: ICD-10-CM

## 2024-08-29 DIAGNOSIS — N18.2 TYPE 2 DIABETES MELLITUS WITH STAGE 2 CHRONIC KIDNEY DISEASE, WITHOUT LONG-TERM CURRENT USE OF INSULIN (HCC): ICD-10-CM

## 2024-08-29 DIAGNOSIS — R05.3 CHRONIC COUGH: ICD-10-CM

## 2024-08-29 PROCEDURE — 99213 OFFICE O/P EST LOW 20 MIN: CPT

## 2024-08-29 PROCEDURE — 71046 X-RAY EXAM CHEST 2 VIEWS: CPT

## 2024-08-29 RX ORDER — DONEPEZIL HYDROCHLORIDE 10 MG/1
10 TABLET, FILM COATED ORAL NIGHTLY
Qty: 90 TABLET | Status: CANCELLED | OUTPATIENT
Start: 2024-08-29

## 2024-08-29 RX ORDER — NAPROXEN 250 MG/1
250 TABLET ORAL 2 TIMES DAILY WITH MEALS
Qty: 180 TABLET | Refills: 0 | Status: CANCELLED | OUTPATIENT
Start: 2024-08-29

## 2024-08-29 RX ORDER — ACETAMINOPHEN 160 MG
1 TABLET,DISINTEGRATING ORAL NIGHTLY
Qty: 90 CAPSULE | Status: CANCELLED | OUTPATIENT
Start: 2024-08-29

## 2024-08-29 RX ORDER — CETIRIZINE HYDROCHLORIDE 10 MG/1
10 TABLET ORAL DAILY
Qty: 90 TABLET | Refills: 3 | Status: SHIPPED | OUTPATIENT
Start: 2024-08-29

## 2024-08-29 RX ORDER — CETIRIZINE HYDROCHLORIDE 10 MG/1
10 TABLET ORAL DAILY
Qty: 90 TABLET | Refills: 3 | Status: CANCELLED | OUTPATIENT
Start: 2024-08-29

## 2024-08-29 RX ORDER — GLIPIZIDE 10 MG/1
10 TABLET, FILM COATED, EXTENDED RELEASE ORAL
Qty: 90 TABLET | Status: CANCELLED | OUTPATIENT
Start: 2024-08-29

## 2024-08-29 RX ORDER — QUETIAPINE FUMARATE 200 MG/1
200 TABLET, FILM COATED ORAL NIGHTLY
Qty: 90 TABLET | Refills: 3 | Status: CANCELLED | OUTPATIENT
Start: 2024-08-29

## 2024-08-29 RX ORDER — ROPINIROLE 1 MG/1
4 TABLET, FILM COATED ORAL NIGHTLY
Qty: 90 TABLET | Refills: 3 | Status: CANCELLED | OUTPATIENT
Start: 2024-08-29

## 2024-08-29 RX ORDER — TRAZODONE HYDROCHLORIDE 150 MG/1
150 TABLET ORAL NIGHTLY
Qty: 90 TABLET | Refills: 0 | Status: CANCELLED | OUTPATIENT
Start: 2024-08-29

## 2024-08-29 RX ORDER — PIOGLITAZONEHYDROCHLORIDE 30 MG/1
30 TABLET ORAL DAILY
Qty: 90 TABLET | Refills: 1 | Status: CANCELLED | OUTPATIENT
Start: 2024-08-29

## 2024-08-29 RX ORDER — GUAIFENESIN 600 MG/1
600 TABLET, EXTENDED RELEASE ORAL 2 TIMES DAILY
Qty: 30 TABLET | Refills: 0 | Status: SHIPPED | OUTPATIENT
Start: 2024-08-29 | End: 2024-09-13

## 2024-08-29 RX ORDER — FAMOTIDINE 20 MG/1
20 TABLET, FILM COATED ORAL 2 TIMES DAILY
Qty: 60 TABLET | Refills: 1 | Status: SHIPPED | OUTPATIENT
Start: 2024-08-29

## 2024-08-29 RX ORDER — ACETAMINOPHEN 160 MG
1 TABLET,DISINTEGRATING ORAL NIGHTLY
COMMUNITY

## 2024-08-29 RX ORDER — BENZONATATE 100 MG/1
100 CAPSULE ORAL 2 TIMES DAILY PRN
Qty: 20 CAPSULE | Refills: 0 | Status: SHIPPED | OUTPATIENT
Start: 2024-08-29 | End: 2024-09-05

## 2024-08-29 RX ORDER — ALBUTEROL SULFATE 90 UG/1
2 AEROSOL, METERED RESPIRATORY (INHALATION) 4 TIMES DAILY
Qty: 17 EACH | Refills: 3 | Status: SHIPPED | OUTPATIENT
Start: 2024-08-29

## 2024-08-29 RX ORDER — ROSUVASTATIN CALCIUM 20 MG/1
20 TABLET, COATED ORAL DAILY
Qty: 90 TABLET | Status: CANCELLED | OUTPATIENT
Start: 2024-08-29

## 2024-08-29 ASSESSMENT — PATIENT HEALTH QUESTIONNAIRE - PHQ9
DEPRESSION UNABLE TO ASSESS: PT REFUSES
1. LITTLE INTEREST OR PLEASURE IN DOING THINGS: NEARLY EVERY DAY
2. FEELING DOWN, DEPRESSED OR HOPELESS: NEARLY EVERY DAY
SUM OF ALL RESPONSES TO PHQ9 QUESTIONS 1 & 2: 6
SUM OF ALL RESPONSES TO PHQ QUESTIONS 1-9: 6

## 2024-08-29 NOTE — PROGRESS NOTES
Needs more albuterol. Satting well on room air, normal WOB, lung exam normal aside from expiratory cough. Patient is a very poor historian and has very odd behavior which limits my diagnostic ability.   - CXR 2 views  - mucinex, tessalon as needed  - albuterol neb refilled  - zyrtec daily    Patient declined BP read and PHQ9. Denied SI/HI.    [To be addressed next visit]  #HTN  T2DM: New dx of HTN at LOV 4 wks ago. Has not started losartan 25mg qd since then, unable to get BP today because he refuses, cannot tolerate. Noted 4/2024 to have elevated urine microalbumin/cr. Last A1c 4/30/24 was 6. Last Cr 6/10/24 was 1.14 with GFR >60. LDL at goal 12/28/23 on crestor 20mg qd.  - start losartan 25mg qd  - check BMP after initiation of ARB in 1 mo  - continue f/up with endocrinologist Dr. Hanna Michele who is managing DM, current regimen is actos 30mg qhs, glipizide 10mg ER qhs.    Follow up: Return in about 2 weeks (around 9/12/2024) for bp and labs.      Diagnosis Orders   1. Chronic cough  albuterol sulfate HFA (PROVENTIL;VENTOLIN;PROAIR) 108 (90 Base) MCG/ACT inhaler    XR CHEST (2 VIEWS)    guaiFENesin (MUCINEX) 600 MG extended release tablet    benzonatate (TESSALON) 100 MG capsule    cetirizine (ZYRTEC) 10 MG tablet      2. Primary hypertension        3. Type 2 diabetes mellitus with stage 2 chronic kidney disease, without long-term current use of insulin ([facility])        4. Generalized pruritus  cetirizine (ZYRTEC) 10 MG tablet      5. Acute bronchitis, unspecified organism  albuterol sulfate HFA (PROVENTIL;VENTOLIN;PROAIR) 108 (90 Base) MCG/ACT inhaler          Patient was discussed with Dr. Peters.    Ashish Espinoza MD  Family Medicine Resident    Patient's past medical history, including but not limited to problem list, allergies, medications, social history, family history, and surgical history reviewed. I have reviewed pertinent labs results and other data. We discussed the expected course, resolution and  complications of the diagnosis(es) in detail.  Medication risks, benefits, costs, interactions, and alternatives were discussed as indicated.  I advised him to contact the office if his condition worsens, changes or fails to improve as anticipated. He expressed understanding with the diagnosis(es) and plan.

## 2024-08-29 NOTE — PROGRESS NOTES
Identified pt with two pt identifiers(name and ). Reviewed record in preparation for visit and have obtained necessary documentation.  Chief Complaint   Patient presents with    Blood Pressure Check    Labs Only        Health Maintenance Due   Topic    COVID-19 Vaccine (1)    Diabetic foot exam     Diabetic retinal exam     DTaP/Tdap/Td vaccine (1 - Tdap)    Colorectal Cancer Screen     Respiratory Syncytial Virus (RSV) Pregnant or age 60 yrs+ (1 - 1-dose 60+ series)    Pneumococcal 65+ years Vaccine (2 of 2 - PPSV23 or PCV20)    Shingles vaccine (1 of 2)    Flu vaccine (1)       Vitals:    24 1346   Pulse: 84   Resp: 20   Temp: 97.6 °F (36.4 °C)   TempSrc: Oral   SpO2: 96%   Weight: 89.9 kg (198 lb 3.2 oz)   Height: 1.702 m (5' 7\")         \"Have you been to the ER, urgent care clinic since your last visit?  Hospitalized since your last visit?\"    NO    “Have you seen or consulted any other health care providers outside of [facility] since your last visit?”    Yes, Dr. Aguiar Pulmonary , Dr. Aldana Urology         “Have you had a colorectal cancer screening such as a colonoscopy/FIT/Cologuard?    YES, facility unknown 2024. Pt states the results were normal.      No colonoscopy on file  No cologuard on file  No FIT/FOBT on file   No flexible sigmoidoscopy on file         Click Here for Release of Records Request     This patient is accompanied in the office by his self.  I have received verbal consent from Harry Bahena to discuss any/all medical information while they are present in the room.

## 2024-08-29 NOTE — TELEPHONE ENCOUNTER
Medication Refill Request    Harry Bahena is requesting a refill of the following medication(s):   Requested Prescriptions     Pending Prescriptions Disp Refills    famotidine (PEPCID) 20 MG tablet [Pharmacy Med Name: famotidine 20 mg tablet] 60 tablet 1     Sig: TAKE ONE TABLET BY MOUTH TWICE DAILY        Listed PCP is Fidel Real MD   Last provider to prescribe medication: Farhan  Last Date of Medication Prescribed: 6/10/2024   Date of Last Office Visit at [facility]: 8/29/2024   FUTURE APPOINTMENT:   Future Appointments   Date Time Provider Department Center   10/3/2024  1:40 PM Ashish Espinoza MD [facility]   11/21/2024  8:30 PM BEDROOM [address] [place] [address] Sleep Lab        Please send refill to:    [place], [place] - [address] [facility] - P [phone] - F [phone]  [address] [facility] [address]  Phone: [phone] Fax: [phone]      Please review request and approve or deny with recommendations.    
Patient informed/Family informed

## 2024-08-30 RX ORDER — LIDOCAINE 50 MG/G
1 PATCH TOPICAL DAILY
COMMUNITY
End: 2024-08-30 | Stop reason: SDUPTHER

## 2024-09-02 RX ORDER — LIDOCAINE 50 MG/G
1 PATCH TOPICAL DAILY
Qty: 30 PATCH | Refills: 0 | Status: SHIPPED | OUTPATIENT
Start: 2024-09-02

## 2024-09-12 RX ORDER — LIDOCAINE 50 MG/G
1 PATCH TOPICAL DAILY
Qty: 30 PATCH | Refills: 0 | OUTPATIENT
Start: 2024-09-12

## 2024-09-16 DIAGNOSIS — L29.9 GENERALIZED PRURITUS: ICD-10-CM

## 2024-09-18 RX ORDER — FAMOTIDINE 20 MG/1
20 TABLET, FILM COATED ORAL 2 TIMES DAILY
Qty: 60 TABLET | Refills: 1 | Status: SHIPPED | OUTPATIENT
Start: 2024-09-18

## 2024-09-19 ENCOUNTER — OFFICE VISIT (OUTPATIENT)
Age: 69
End: 2024-09-19
Payer: MEDICARE

## 2024-09-19 VITALS
HEIGHT: 67 IN | SYSTOLIC BLOOD PRESSURE: 128 MMHG | OXYGEN SATURATION: 97 % | BODY MASS INDEX: 31.08 KG/M2 | RESPIRATION RATE: 18 BRPM | TEMPERATURE: 98.2 F | DIASTOLIC BLOOD PRESSURE: 72 MMHG | HEART RATE: 72 BPM | WEIGHT: 198 LBS

## 2024-09-19 DIAGNOSIS — G89.29 CHRONIC BILATERAL LOW BACK PAIN WITHOUT SCIATICA: ICD-10-CM

## 2024-09-19 DIAGNOSIS — G25.81 RLS (RESTLESS LEGS SYNDROME): ICD-10-CM

## 2024-09-19 DIAGNOSIS — N18.2 TYPE 2 DIABETES MELLITUS WITH STAGE 2 CHRONIC KIDNEY DISEASE, WITHOUT LONG-TERM CURRENT USE OF INSULIN (HCC): ICD-10-CM

## 2024-09-19 DIAGNOSIS — E11.22 TYPE 2 DIABETES MELLITUS WITH STAGE 2 CHRONIC KIDNEY DISEASE, WITHOUT LONG-TERM CURRENT USE OF INSULIN (HCC): ICD-10-CM

## 2024-09-19 DIAGNOSIS — I10 PRIMARY HYPERTENSION: Primary | ICD-10-CM

## 2024-09-19 DIAGNOSIS — M54.50 CHRONIC BILATERAL LOW BACK PAIN WITHOUT SCIATICA: ICD-10-CM

## 2024-09-19 PROCEDURE — 99213 OFFICE O/P EST LOW 20 MIN: CPT

## 2024-09-19 RX ORDER — ROPINIROLE 1 MG/1
TABLET, FILM COATED ORAL
Qty: 90 TABLET | Refills: 3 | Status: SHIPPED | OUTPATIENT
Start: 2024-09-19

## 2024-09-19 RX ORDER — LIDOCAINE 50 MG/G
1 PATCH TOPICAL DAILY
Qty: 90 PATCH | Refills: 1 | Status: SHIPPED | OUTPATIENT
Start: 2024-09-19

## 2024-09-19 ASSESSMENT — PATIENT HEALTH QUESTIONNAIRE - PHQ9
2. FEELING DOWN, DEPRESSED OR HOPELESS: NOT AT ALL
SUM OF ALL RESPONSES TO PHQ QUESTIONS 1-9: 0
SUM OF ALL RESPONSES TO PHQ9 QUESTIONS 1 & 2: 0
SUM OF ALL RESPONSES TO PHQ QUESTIONS 1-9: 0
SUM OF ALL RESPONSES TO PHQ QUESTIONS 1-9: 0
1. LITTLE INTEREST OR PLEASURE IN DOING THINGS: NOT AT ALL
SUM OF ALL RESPONSES TO PHQ QUESTIONS 1-9: 0

## 2024-10-01 DIAGNOSIS — E11.9 TYPE 2 DIABETES MELLITUS WITHOUT COMPLICATION, WITHOUT LONG-TERM CURRENT USE OF INSULIN (HCC): ICD-10-CM

## 2024-10-01 NOTE — TELEPHONE ENCOUNTER
Medication Refill Request    Harry Bahena is requesting a refill of the following medication(s):   Requested Prescriptions     Pending Prescriptions Disp Refills    Continuous Glucose Sensor (FREESTYLE LISBET 2 SENSOR) MISC [Pharmacy Med Name: FreeStyle Lisbet 2 Sensor Miscellaneous]  0     Sig: APPLY EVERY 14 DAYS AS DIRECTED        Listed PCP is Fidel Real MD   Last provider to prescribe medication: Farhan  Last Date of Medication Prescribed: 6/10/24   Date of Last Office Visit at [facility]: 9/19/24   FUTURE APPOINTMENT:   Future Appointments   Date Time Provider Department Center   11/14/2024  1:40 PM Ashish Espinoza MD [facility]   11/21/2024  8:30 PM BEDROOM 2 [facility] Sleep Lab        Please send refill to:    [facility] # [id number] - [place], [place] - [address] [place] [address] -  [phone] - F [phone]  [address] [place] [address]  Phone: [phone] Fax: [phone]    Please review request and approve or deny with recommendations.

## 2024-12-23 ENCOUNTER — OFFICE VISIT (OUTPATIENT)
Age: 69
End: 2024-12-23
Payer: MEDICARE

## 2024-12-23 VITALS
HEIGHT: 67 IN | BODY MASS INDEX: 29.35 KG/M2 | DIASTOLIC BLOOD PRESSURE: 90 MMHG | OXYGEN SATURATION: 94 % | RESPIRATION RATE: 18 BRPM | TEMPERATURE: 97.9 F | HEART RATE: 100 BPM | SYSTOLIC BLOOD PRESSURE: 146 MMHG | WEIGHT: 187 LBS

## 2024-12-23 DIAGNOSIS — M79.605 PAIN IN BOTH LOWER EXTREMITIES: Primary | ICD-10-CM

## 2024-12-23 DIAGNOSIS — M79.604 PAIN IN BOTH LOWER EXTREMITIES: Primary | ICD-10-CM

## 2024-12-23 DIAGNOSIS — Z71.1 CONCERN ABOUT MEMORY: ICD-10-CM

## 2024-12-23 PROCEDURE — 99213 OFFICE O/P EST LOW 20 MIN: CPT

## 2024-12-23 NOTE — PROGRESS NOTES
[facility] Medicine Residency   [facility]    Subjective:  CC:   Chief Complaint   Patient presents with    Hypertension     Pt reports to F/U with existing conditions       HPI:  Harry Bahena is 69 y.o. male who presents for     Leg pressure b/l mostly at night but random throughout the day. Seems random, nonpositional. Going on for a long time. Not the same as the RLS he has had. Thinks the legs may be swollen as well.        Objective:    Vitals:    12/23/24 1357   BP: (!) 146/90   Site: Right Upper Arm   Position: Sitting   Cuff Size: Large Adult   Pulse: 100   Resp: 18   Temp: 97.9 °F (36.6 °C)   TempSrc: Oral   SpO2: 94%   Weight: 84.8 kg (187 lb)   Height: 1.702 m (5' 7\")       Physical Exam  Constitutional:       General: He is not in acute distress.     Appearance: Normal appearance.   HENT:      Head: Normocephalic and atraumatic.      Mouth/Throat:      Mouth: Mucous membranes are moist.   Eyes:      Extraocular Movements: Extraocular movements intact.      Conjunctiva/sclera: Conjunctivae normal.   Cardiovascular:      Rate and Rhythm: Normal rate and regular rhythm.      Pulses: Normal pulses.      Heart sounds: Normal heart sounds.   Pulmonary:      Effort: Pulmonary effort is normal.      Breath sounds: Normal breath sounds.   Abdominal:      General: Abdomen is flat.   Musculoskeletal:         General: Normal range of motion.      Right lower leg: No edema.      Left lower leg: No edema.   Skin:     General: Skin is warm and dry.   Neurological:      General: No focal deficit present.      Mental Status: He is alert.   Psychiatric:         Mood and Affect: Mood normal.         Behavior: Behavior normal.         No results found for this or any previous visit (from the past 12 hour(s)).  All imaging and labs ordered in clinic personally reviewed by me.     Assessment and Plan:    Unclear dx of random pressure sensation in L > R LE, more at night. He thinks the legs are

## 2024-12-23 NOTE — PROGRESS NOTES
Patient has been identified by name and .    Chief Complaint   Patient presents with    Hypertension     Pt reports to F/U with existing conditions       Vitals:    24 1357   BP: (!) 146/90   Site: Right Upper Arm   Position: Sitting   Cuff Size: Large Adult   Pulse: 100   Resp: 18   Temp: 97.9 °F (36.6 °C)   TempSrc: Oral   SpO2: 94%   Weight: 84.8 kg (187 lb)   Height: 1.702 m (5' 7\")        \"Have you been to the ER, urgent care clinic since your last visit?  Hospitalized since your last visit?\"    NO    “Have you seen or consulted any other health care providers outside of [facility] since your last visit?”    NO    “Have you had a colorectal cancer screening such as a colonoscopy/FIT/Cologuard?    NO    No colonoscopy on file  No cologuard on file  No FIT/FOBT on file   No flexible sigmoidoscopy on file

## 2024-12-23 NOTE — PATIENT INSTRUCTIONS
Call Central Scheduling [phone] to get ultrasound of legs scheduled   Randy Styles)  Otolaryngology  [address] [place] [address], [address] [place] [address]  Phone: [phone]  Fax: [phone]  Follow Up Time: 1-3 Days

## 2024-12-24 DIAGNOSIS — G25.81 RLS (RESTLESS LEGS SYNDROME): ICD-10-CM

## 2024-12-26 RX ORDER — ROPINIROLE 1 MG/1
2 TABLET, FILM COATED ORAL NIGHTLY
Qty: 180 TABLET | Refills: 2 | Status: SHIPPED | OUTPATIENT
Start: 2024-12-26

## 2024-12-26 NOTE — TELEPHONE ENCOUNTER
Medication Refill Request    Harry Bahena is requesting a refill of the following medication(s):   Requested Prescriptions     Pending Prescriptions Disp Refills    rOPINIRole (REQUIP) 1 MG tablet [Pharmacy Med Name: ROPINIROLE HCL TABS 1MG] 90 tablet 14     Sig: TAKE 1 TO 4 TABLETS (1 TO 4 MG) NIGHTLY AS NEEDED FOR RESTLESS LEGS        Listed PCP is Ashish Espinoza MD   Last provider to prescribe medication: Dr. Espinoza on 09/19/2024  Date of Last Office Visit at [facility]: 12/23/24 with Dr. Espinoza    FUTURE APPOINTMENT:   Future Appointments   Date Time Provider Department Center   2/5/2025 11:00 AM Ashish Espinoza MD [facility] ECC DEP       Please send refill to:      [facility] - [address] - P [phone] - F [phone]  [address] [place] [address]  Phone: [phone] Fax: [phone]        Please review request and approve or deny with recommendations within 48 hours.

## 2025-01-03 ENCOUNTER — TELEPHONE (OUTPATIENT)
Age: 70
End: 2025-01-03

## 2025-01-03 DIAGNOSIS — G25.81 RLS (RESTLESS LEGS SYNDROME): ICD-10-CM

## 2025-01-03 RX ORDER — ROPINIROLE 1 MG/1
2 TABLET, FILM COATED ORAL NIGHTLY
Qty: 180 TABLET | Refills: 2 | Status: SHIPPED | OUTPATIENT
Start: 2025-01-03

## 2025-01-03 NOTE — TELEPHONE ENCOUNTER
Adrián from [employer] called for clarity on the directions for rOPINIRole (REQUIP) 1 MG tablet. Current directions state \"Take 2 tablets by mouth nightly TAKE 1 TO 4 TABLETS (1 TO 4 MG) NIGHTLY AS NEEDED FOR RESTLESS LEGS .\"    Please contact [employer] at [phone], use reference # [id number].

## 2025-01-08 DIAGNOSIS — N18.2 TYPE 2 DIABETES MELLITUS WITH STAGE 2 CHRONIC KIDNEY DISEASE, WITHOUT LONG-TERM CURRENT USE OF INSULIN (HCC): ICD-10-CM

## 2025-01-08 DIAGNOSIS — E11.22 TYPE 2 DIABETES MELLITUS WITH STAGE 2 CHRONIC KIDNEY DISEASE, WITHOUT LONG-TERM CURRENT USE OF INSULIN (HCC): ICD-10-CM

## 2025-01-08 DIAGNOSIS — R80.9 MICROALBUMINURIA: ICD-10-CM

## 2025-01-08 DIAGNOSIS — I10 PRIMARY HYPERTENSION: ICD-10-CM

## 2025-01-08 RX ORDER — LOSARTAN POTASSIUM 25 MG/1
25 TABLET ORAL DAILY
Qty: 90 TABLET | Refills: 3 | Status: SHIPPED | OUTPATIENT
Start: 2025-01-08

## 2025-02-05 ENCOUNTER — OFFICE VISIT (OUTPATIENT)
Age: 70
End: 2025-02-05

## 2025-02-05 VITALS
BODY MASS INDEX: 30.23 KG/M2 | WEIGHT: 193 LBS | RESPIRATION RATE: 16 BRPM | SYSTOLIC BLOOD PRESSURE: 134 MMHG | DIASTOLIC BLOOD PRESSURE: 72 MMHG

## 2025-02-05 DIAGNOSIS — Z00.00 MEDICARE ANNUAL WELLNESS VISIT, SUBSEQUENT: Primary | ICD-10-CM

## 2025-02-05 NOTE — PROGRESS NOTES
Medicare Annual Wellness Visit    Harry Bahena is here for Medicare AWV    Assessment & Plan   Medicare annual wellness visit, subsequent       Return in 1 year (on 2/5/2026) for Medicare AWV.     Subjective     Patient states this visit is stupid and a waste of money.    Patient's complete Health Risk Assessment and screening values have been reviewed and are found in Flowsheets. The following problems were reviewed today and where indicated follow up appointments were made and/or referrals ordered.    Positive Risk Factor Screenings with Interventions:            Self-assessment of health:  In general, how would you say your health is?: (!) Poor    Interventions:  Patient advised to follow-up in this office for further evaluation and treatment    General HRA Questions:  Select all that apply: (!) New or Increased Fatigue, Social Isolation  Interventions Fatigue:  Patient declined any further interventions or treatment  Interventions - Social Isolation:  Patient declined any further interventions or treatment      Inactivity:  On average, how many days per week do you engage in moderate to strenuous exercise (like a brisk walk)?: 0 days (!) Abnormal  On average, how many minutes do you engage in exercise at this level?: 0 min  Interventions:  Patient declined any further interventions or treatment    Poor Eating Habits/Diet:  Do you eat balanced/healthy meals regularly?: (!) No  Interventions:  Patient comments: says he does eat healthy sometimes, but \"lack of a cook who can cook worth a damn\" is holding him back    Abnormal BMI (obese):  Body mass index is 30.23 kg/m². (!) Abnormal  Interventions:  Patient declines any further evaluation or treatment        Dentist Screen:  Have you seen the dentist within the past year?: (!) No    Intervention:  Patient comments: saw dentist 1-2 months ago    Hearing Screen:  Do you or your family notice any trouble with your hearing that hasn't been managed with hearing

## 2025-02-05 NOTE — PATIENT INSTRUCTIONS
have questions about a medical condition or this instruction, always ask your healthcare professional. [employer] disclaims any warranty or liability for your use of this information.         Hearing Loss: Care Instructions  Overview     Hearing loss is a sudden or slow decrease in how well you hear. It can range from slight to profound. Permanent hearing loss can occur with aging. It also can happen when you are exposed long-term to loud noise. Examples include listening to loud music, riding motorcycles, or being around other loud machines.  Hearing loss can affect your work and home life. It can make you feel lonely or depressed. You may feel that you have lost your independence. But hearing aids and other devices can help you hear better and feel connected to others.  Follow-up care is a key part of your treatment and safety. Be sure to make and go to all appointments, and call your doctor if you are having problems. It's also a good idea to know your test results and keep a list of the medicines you take.  How can you care for yourself at home?  Avoid loud noises whenever possible. This helps keep your hearing from getting worse.  Always wear hearing protection around loud noises.  Wear a hearing aid as directed.  A professional can help you pick a hearing aid that will work best for you.  You can also get hearing aids over the counter for mild to moderate hearing loss.  Have hearing tests as your doctor suggests. They can show whether your hearing has changed. Your hearing aid may need to be adjusted.  Use other devices as needed. These may include:  Telephone amplifiers and hearing aids that can connect to a television, stereo, radio, or microphone.  Devices that use lights or vibrations. These alert you to the doorbell, a ringing telephone, or a baby monitor.  Television closed-captioning. This shows the words at the bottom of the screen. Most new TVs can do this.  TTY (text telephone). This

## 2025-02-05 NOTE — PROGRESS NOTES
Chief Complaint   Patient presents with    Medicare AWV       Patient identified with name and .     Vitals:    25 1101   BP: 134/72   Site: Left Upper Arm   Position: Sitting   Cuff Size: Large Adult   Resp: 16   Weight: 87.5 kg (193 lb)        1. Have you been to the ER, urgent care clinic since your last visit?  Hospitalized since your last visit?No    2. Have you seen or consulted any other health care providers outside of the [facility] since your last visit?  Include any pap smears or colon screening. No    Health Maintenance reviewed.

## 2025-04-08 DIAGNOSIS — R05.3 CHRONIC COUGH: ICD-10-CM

## 2025-04-08 DIAGNOSIS — J20.9 ACUTE BRONCHITIS, UNSPECIFIED ORGANISM: ICD-10-CM

## 2025-04-09 NOTE — TELEPHONE ENCOUNTER
Medication Refill Request    Harry Bahena is requesting a refill of the following medication(s):   Requested Prescriptions     Pending Prescriptions Disp Refills    albuterol sulfate HFA (PROVENTIL;VENTOLIN;PROAIR) 108 (90 Base) MCG/ACT inhaler [Pharmacy Med Name: ALBUTEROL(PROAIR) HFA INHALER 90MCG] 25.5 g 3     Sig: USE 2 INHALATIONS FOUR TIMES A DAY        Listed PCP is Ashish Espinoza MD   Last provider to prescribe medication: Olga  Last Date of Medication Prescribed: 8/29/24   Date of Last Office Visit at [facility]: 2/5/25   FUTURE APPOINTMENT: No future appointments.    Please send refill to:    [facility] - [address] -  [phone] - F [phone]  [address] [place] [address]  Phone: [phone] Fax: [phone]    Please review request and approve or deny with recommendations.

## 2025-04-10 RX ORDER — ALBUTEROL SULFATE 90 UG/1
INHALANT RESPIRATORY (INHALATION)
Qty: 25.5 G | Refills: 3 | Status: SHIPPED | OUTPATIENT
Start: 2025-04-10

## 2025-04-22 ENCOUNTER — OFFICE VISIT (OUTPATIENT)
Age: 70
End: 2025-04-22
Payer: MEDICARE

## 2025-04-22 ENCOUNTER — ANCILLARY PROCEDURE (OUTPATIENT)
Age: 70
End: 2025-04-22
Payer: MEDICARE

## 2025-04-22 VITALS — HEIGHT: 67 IN | TEMPERATURE: 97.4 F | BODY MASS INDEX: 30.22 KG/M2 | HEART RATE: 87 BPM | OXYGEN SATURATION: 96 %

## 2025-04-22 DIAGNOSIS — G89.29 CHRONIC BILATERAL LOW BACK PAIN WITHOUT SCIATICA: ICD-10-CM

## 2025-04-22 DIAGNOSIS — M79.601 ARM PAIN, POSTERIOR, RIGHT: ICD-10-CM

## 2025-04-22 DIAGNOSIS — M79.601 ARM PAIN, POSTERIOR, RIGHT: Primary | ICD-10-CM

## 2025-04-22 DIAGNOSIS — M54.50 CHRONIC BILATERAL LOW BACK PAIN WITHOUT SCIATICA: ICD-10-CM

## 2025-04-22 PROCEDURE — 1123F ACP DISCUSS/DSCN MKR DOCD: CPT | Performed by: STUDENT IN AN ORGANIZED HEALTH CARE EDUCATION/TRAINING PROGRAM

## 2025-04-22 PROCEDURE — 99213 OFFICE O/P EST LOW 20 MIN: CPT | Performed by: STUDENT IN AN ORGANIZED HEALTH CARE EDUCATION/TRAINING PROGRAM

## 2025-04-22 PROCEDURE — G8427 DOCREV CUR MEDS BY ELIG CLIN: HCPCS | Performed by: STUDENT IN AN ORGANIZED HEALTH CARE EDUCATION/TRAINING PROGRAM

## 2025-04-22 PROCEDURE — 1159F MED LIST DOCD IN RCRD: CPT | Performed by: STUDENT IN AN ORGANIZED HEALTH CARE EDUCATION/TRAINING PROGRAM

## 2025-04-22 PROCEDURE — 4004F PT TOBACCO SCREEN RCVD TLK: CPT | Performed by: STUDENT IN AN ORGANIZED HEALTH CARE EDUCATION/TRAINING PROGRAM

## 2025-04-22 PROCEDURE — G8417 CALC BMI ABV UP PARAM F/U: HCPCS | Performed by: STUDENT IN AN ORGANIZED HEALTH CARE EDUCATION/TRAINING PROGRAM

## 2025-04-22 PROCEDURE — 73060 X-RAY EXAM OF HUMERUS: CPT

## 2025-04-22 PROCEDURE — 3017F COLORECTAL CA SCREEN DOC REV: CPT | Performed by: STUDENT IN AN ORGANIZED HEALTH CARE EDUCATION/TRAINING PROGRAM

## 2025-04-22 RX ORDER — LIDOCAINE 50 MG/G
1 PATCH TOPICAL DAILY
Qty: 90 PATCH | Refills: 1 | Status: SHIPPED | OUTPATIENT
Start: 2025-04-22

## 2025-04-22 RX ORDER — IBUPROFEN 600 MG/1
600 TABLET, FILM COATED ORAL 3 TIMES DAILY PRN
Qty: 30 TABLET | Refills: 0 | Status: SHIPPED | OUTPATIENT
Start: 2025-04-22

## 2025-04-22 NOTE — PROGRESS NOTES
Harry Bahena is a 69 y.o. male      Chief Complaint   Patient presents with    Arm Pain     Right side aches.   Insomnia.         \"Have you been to the ER, urgent care clinic since your last visit?  Hospitalized since your last visit?\"    NO    “Have you seen or consulted any other health care providers outside of [facility] since your last visit?”    NO      No colonoscopy on file  No cologuard on file  No FIT/FOBT on file   No flexible sigmoidoscopy on file         Click Here for Release of Records Request    Vitals:    04/22/25 1113   Pulse: 87   Temp: 97.4 °F (36.3 °C)   TempSrc: Oral   SpO2: 96%   Height: 1.702 m (5' 7.01\")           Medication Reconciliation Completed, changes notes. Please Update medication list.

## 2025-04-22 NOTE — PROGRESS NOTES
[facility] Note      History of Present Illness:     Chief Complaint   Patient presents with    Arm Pain     Right side aches, for 2 weeks, no known injury.  Insomnia.         Harry Bahena is a 69 y.o. male who presents for the following:    #R Arm  pain - for several weeks. Upper posterior arm. Aching with movement. Heating pad has helped a little. NSAIDs and tylenol have not helped much. No numbness or tingling. +subjective weakness. Fell out of a chair a few weeks ago, backwards. Unsure how this happened. No bruising. Unsure if the pain started then or before this incident.      PMH (REVIEWED):  Past Medical History:   Diagnosis Date    Anxiety     Arthritis     Asthma     Burning with urination     Chronic back pain     Depression     Diabetes (HCC)     GERD (gastroesophageal reflux disease)     Hearing loss graciela    tika    Ill-defined condition     lymphoma  (microscopic)    Memory loss     Psychiatric diagnosis     Generalized Anxiety Disorder    Psychiatric disorder     Anxiety    Psychiatric disorder     Depression    Restless legs syndrome graciela    and self    Seizures (HCC) tika    Sleep apnea     Type 2 diabetes mellitus without complication     Urinary incontinence        Current Medications/Allergies (REVIEWED):     Current Outpatient Medications on File Prior to Visit   Medication Sig Dispense Refill    albuterol sulfate HFA (PROVENTIL;VENTOLIN;PROAIR) 108 (90 Base) MCG/ACT inhaler USE 2 INHALATIONS FOUR TIMES A DAY 25.5 g 3    losartan (COZAAR) 25 MG tablet TAKE 1 TABLET DAILY 90 tablet 3    rOPINIRole (REQUIP) 1 MG tablet Take 2 tablets by mouth nightly 180 tablet 2    Continuous Glucose Sensor (FREESTYLE HIWOT 2 SENSOR) MISC APPLY EVERY 14 DAYS AS DIRECTED 4 each 1    Cholecalciferol (VITAMIN D3) 50 MCG (2000 UT) CAPS Take 1 capsule by mouth at bedtime      MAGNESIUM PO Take 100 mg by mouth at bedtime      cetirizine (ZYRTEC) 10 MG tablet Take 1 tablet by mouth

## 2025-05-28 ENCOUNTER — OFFICE VISIT (OUTPATIENT)
Age: 70
End: 2025-05-28
Payer: MEDICARE

## 2025-05-28 ENCOUNTER — ANCILLARY PROCEDURE (OUTPATIENT)
Age: 70
End: 2025-05-28
Payer: MEDICARE

## 2025-05-28 VITALS
HEART RATE: 86 BPM | DIASTOLIC BLOOD PRESSURE: 68 MMHG | OXYGEN SATURATION: 95 % | TEMPERATURE: 98.1 F | SYSTOLIC BLOOD PRESSURE: 105 MMHG

## 2025-05-28 DIAGNOSIS — Z91.81 AT HIGH RISK FOR FALLS: ICD-10-CM

## 2025-05-28 DIAGNOSIS — E08.65 DIABETES MELLITUS DUE TO UNDERLYING CONDITION WITH HYPERGLYCEMIA, WITHOUT LONG-TERM CURRENT USE OF INSULIN (HCC): ICD-10-CM

## 2025-05-28 DIAGNOSIS — N18.2 TYPE 2 DIABETES MELLITUS WITH STAGE 2 CHRONIC KIDNEY DISEASE, WITHOUT LONG-TERM CURRENT USE OF INSULIN (HCC): ICD-10-CM

## 2025-05-28 DIAGNOSIS — F33.1 MODERATE EPISODE OF RECURRENT MAJOR DEPRESSIVE DISORDER (HCC): ICD-10-CM

## 2025-05-28 DIAGNOSIS — R05.3 CHRONIC COUGH: ICD-10-CM

## 2025-05-28 DIAGNOSIS — F03.90 DEMENTIA, UNSPECIFIED DEMENTIA SEVERITY, UNSPECIFIED DEMENTIA TYPE, UNSPECIFIED WHETHER BEHAVIORAL, PSYCHOTIC, OR MOOD DISTURBANCE OR ANXIETY (HCC): ICD-10-CM

## 2025-05-28 DIAGNOSIS — M79.10 MYALGIA: Primary | ICD-10-CM
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
## 2025-05-28 DIAGNOSIS — E11.22 TYPE 2 DIABETES MELLITUS WITH STAGE 2 CHRONIC KIDNEY DISEASE, WITHOUT LONG-TERM CURRENT USE OF INSULIN (HCC): ICD-10-CM

## 2025-05-28 DIAGNOSIS — R68.89 COLD INTOLERANCE: ICD-10-CM

## 2025-05-28 DIAGNOSIS — C85.90 LYMPHOMA, LOW GRADE (HCC): ICD-10-CM

## 2025-05-28 PROCEDURE — 99214 OFFICE O/P EST MOD 30 MIN: CPT | Performed by: STUDENT IN AN ORGANIZED HEALTH CARE EDUCATION/TRAINING PROGRAM

## 2025-05-28 PROCEDURE — 1125F AMNT PAIN NOTED PAIN PRSNT: CPT | Performed by: STUDENT IN AN ORGANIZED HEALTH CARE EDUCATION/TRAINING PROGRAM

## 2025-05-28 PROCEDURE — 1036F TOBACCO NON-USER: CPT | Performed by: STUDENT IN AN ORGANIZED HEALTH CARE EDUCATION/TRAINING PROGRAM

## 2025-05-28 PROCEDURE — 1123F ACP DISCUSS/DSCN MKR DOCD: CPT | Performed by: STUDENT IN AN ORGANIZED HEALTH CARE EDUCATION/TRAINING PROGRAM

## 2025-05-28 PROCEDURE — 1159F MED LIST DOCD IN RCRD: CPT | Performed by: STUDENT IN AN ORGANIZED HEALTH CARE EDUCATION/TRAINING PROGRAM

## 2025-05-28 PROCEDURE — 71046 X-RAY EXAM CHEST 2 VIEWS: CPT

## 2025-05-28 PROCEDURE — 3017F COLORECTAL CA SCREEN DOC REV: CPT | Performed by: STUDENT IN AN ORGANIZED HEALTH CARE EDUCATION/TRAINING PROGRAM

## 2025-05-28 PROCEDURE — 3044F HG A1C LEVEL LT 7.0%: CPT | Performed by: STUDENT IN AN ORGANIZED HEALTH CARE EDUCATION/TRAINING PROGRAM

## 2025-05-28 PROCEDURE — 3074F SYST BP LT 130 MM HG: CPT | Performed by: STUDENT IN AN ORGANIZED HEALTH CARE EDUCATION/TRAINING PROGRAM

## 2025-05-28 PROCEDURE — 2022F DILAT RTA XM EVC RTNOPTHY: CPT | Performed by: STUDENT IN AN ORGANIZED HEALTH CARE EDUCATION/TRAINING PROGRAM

## 2025-05-28 PROCEDURE — G8427 DOCREV CUR MEDS BY ELIG CLIN: HCPCS | Performed by: STUDENT IN AN ORGANIZED HEALTH CARE EDUCATION/TRAINING PROGRAM

## 2025-05-28 PROCEDURE — G8417 CALC BMI ABV UP PARAM F/U: HCPCS | Performed by: STUDENT IN AN ORGANIZED HEALTH CARE EDUCATION/TRAINING PROGRAM

## 2025-05-28 PROCEDURE — 3078F DIAST BP <80 MM HG: CPT | Performed by: STUDENT IN AN ORGANIZED HEALTH CARE EDUCATION/TRAINING PROGRAM

## 2025-05-28 RX ORDER — LEVOFLOXACIN 500 MG/1
500 TABLET, FILM COATED ORAL DAILY
COMMUNITY

## 2025-05-28 SDOH — ECONOMIC STABILITY: FOOD INSECURITY: WITHIN THE PAST 12 MONTHS, THE FOOD YOU BOUGHT JUST DIDN'T LAST AND YOU DIDN'T HAVE MONEY TO GET MORE.: NEVER TRUE

## 2025-05-28 SDOH — ECONOMIC STABILITY: FOOD INSECURITY: WITHIN THE PAST 12 MONTHS, YOU WORRIED THAT YOUR FOOD WOULD RUN OUT BEFORE YOU GOT MONEY TO BUY MORE.: NEVER TRUE

## 2025-05-28 NOTE — PROGRESS NOTES
[facility] Clinic Note      History of Present Illness:     Chief Complaint   Patient presents with    Follow-up     Congestion, extreme weakness. Pt reports feeling extremely cold during warm temperatures.         Harry Bahena is a 69 y.o. male with a PMH notable for mood disorder, immobility, deconditioning, chronic pain, T2DM, HTN, HLD, CKD who presents for followup    #R. Arm Pain - ongoing, no change since last visit. XR unremarkable. Multiple point tenderness, comes in wave, has severely limited movement. See previous exam. He feels he may have lyme disease as he has found ticks on his body in the past weeks though he has not had a rash. Has had other myalgias and joint pains. Watched a program on TV about lyme disease.    #Cold intolerance - ongoing for abut a year. Constant.    #Fatigue - feels exhausted daily. Worried he has lyme disease. Has found ticks on his body recently. Concerned about lyme    #Frequent falls - for years. Due to weakness, fatigue.    #Cough, congestion - requests levoquin which he was treated with for previous PNA. This has been going on for years. Worse during allergy season.     PMH (REVIEWED):  Past Medical History:   Diagnosis Date    Anxiety     Arthritis     Asthma     Burning with urination     Chronic back pain     Depression     Diabetes (HCC)     GERD (gastroesophageal reflux disease)     Hearing loss graciela    tika    Ill-defined condition     lymphoma  (microscopic)    Memory loss     Psychiatric diagnosis     Generalized Anxiety Disorder    Psychiatric disorder     Anxiety    Psychiatric disorder     Depression    Restless legs syndrome graciela    and self    Seizures (HCC) tika    Sleep apnea     Type 2 diabetes mellitus without complication (HCC)     Urinary incontinence        Current Medications/Allergies (REVIEWED):     Current Outpatient Medications on File Prior to Visit   Medication Sig Dispense Refill    levoFLOXacin (LEVAQUIN) 500 MG

## 2025-05-28 NOTE — PROGRESS NOTES
Identified pt with two pt identifiers(name and ). Reviewed record in preparation for visit and have obtained necessary documentation.  Chief Complaint   Patient presents with    Follow-up     Congestion, extreme weakness. Pt reports feeling extremely cold during warm temperatures.          Vitals:    25 1408   BP: 105/68   BP Site: Left Upper Arm   Patient Position: Sitting   BP Cuff Size: Medium Adult   Pulse: 86   Temp: 98.1 °F (36.7 °C)   TempSrc: Oral   SpO2: 95%         Coordination of Care Questionnaire:  :     \"Have you been to the ER, urgent care clinic since your last visit?  Hospitalized since your last visit?\"    NO    “Have you seen or consulted any other health care providers outside of [facility] since your last visit?”    YES - When: approximately 1  weeks ago.  Where and Why: [facility] work .        “Have you had a colorectal cancer screening such as a colonoscopy/FIT/Cologuard?    YES - Type: Colonoscopy - Where: [facility]     Nurse/CMA to request most recent records if not in the chart     No colonoscopy on file  No cologuard on file  No FIT/FOBT on file   No flexible sigmoidoscopy on file         Click Here for Release of Records Request

## 2025-05-29 LAB
ALBUMIN SERPL-MCNC: 4.2 G/DL (ref 3.5–5)
ALBUMIN/GLOB SERPL: 1.3 (ref 1.1–2.2)
ALP SERPL-CCNC: 89 U/L (ref 45–117)
ALT SERPL-CCNC: 38 U/L (ref 12–78)
ANION GAP SERPL CALC-SCNC: 6 MMOL/L (ref 2–12)
AST SERPL-CCNC: 20 U/L (ref 15–37)
BILIRUB SERPL-MCNC: 0.6 MG/DL (ref 0.2–1)
BUN SERPL-MCNC: 28 MG/DL (ref 6–20)
BUN/CREAT SERPL: 25 (ref 12–20)
CALCIUM SERPL-MCNC: 10.3 MG/DL (ref 8.5–10.1)
CHLORIDE SERPL-SCNC: 103 MMOL/L (ref 97–108)
CO2 SERPL-SCNC: 27 MMOL/L (ref 21–32)
CREAT SERPL-MCNC: 1.13 MG/DL (ref 0.7–1.3)
CRP SERPL-MCNC: <0.29 MG/DL (ref 0–0.3)
ERYTHROCYTE [DISTWIDTH] IN BLOOD BY AUTOMATED COUNT: 13.8 % (ref 11.5–14.5)
EST. AVERAGE GLUCOSE BLD GHB EST-MCNC: 120 MG/DL
GLOBULIN SER CALC-MCNC: 3.2 G/DL (ref 2–4)
GLUCOSE SERPL-MCNC: 117 MG/DL (ref 65–100)
HBA1C MFR BLD: 5.8 % (ref 4–5.6)
HCT VFR BLD AUTO: 48.4 % (ref 36.6–50.3)
HGB BLD-MCNC: 15.1 G/DL (ref 12.1–17)
MCH RBC QN AUTO: 31.6 PG (ref 26–34)
MCHC RBC AUTO-ENTMCNC: 31.2 G/DL (ref 30–36.5)
MCV RBC AUTO: 101.3 FL (ref 80–99)
NRBC # BLD: 0 K/UL (ref 0–0.01)
NRBC BLD-RTO: 0 PER 100 WBC
PLATELET # BLD AUTO: 255 K/UL (ref 150–400)
PMV BLD AUTO: 10.5 FL (ref 8.9–12.9)
POTASSIUM SERPL-SCNC: 4.7 MMOL/L (ref 3.5–5.1)
PROT SERPL-MCNC: 7.4 G/DL (ref 6.4–8.2)
RBC # BLD AUTO: 4.78 M/UL (ref 4.1–5.7)
SODIUM SERPL-SCNC: 136 MMOL/L (ref 136–145)
T4 FREE SERPL-MCNC: 0.9 NG/DL (ref 0.8–1.5)
TSH SERPL DL<=0.05 MIU/L-ACNC: 2.49 UIU/ML (ref 0.36–3.74)
WBC # BLD AUTO: 7.7 K/UL (ref 4.1–11.1)

## 2025-05-30 LAB
ANA SER QL: NEGATIVE
LYME ANTIBODY: NEGATIVE

## 2025-06-06 RX ORDER — FAMOTIDINE 20 MG/1
20 TABLET, FILM COATED ORAL 2 TIMES DAILY
Qty: 180 TABLET | Refills: 0 | Status: SHIPPED | OUTPATIENT
Start: 2025-06-06

## 2025-06-06 NOTE — TELEPHONE ENCOUNTER
Medication Refill Request    Harry LOMAX Josef is requesting a refill of the following medication(s):   Requested Prescriptions     Pending Prescriptions Disp Refills    famotidine (PEPCID) 20 MG tablet [Pharmacy Med Name: FAMOTIDINE TABS 20MG]  0        Listed PCP is Ashish Espinoza MD   Last provider to prescribe medication: Farhan  Last Date of Medication Prescribed: 9/18/24  Date of Last Office Visit at [facility]: 5/28/25   FUTURE APPOINTMENT: No future appointments.    Please send refill to:    [facility] [address] -  [phone] - F [phone]  [address] [place] [id number]  Phone: [phone] Fax: [phone]      Please review request and approve or deny with recommendations.

## 2025-06-07 DIAGNOSIS — L29.9 PRURITUS, UNSPECIFIED: ICD-10-CM

## 2025-06-09 RX ORDER — FAMOTIDINE 20 MG/1
20 TABLET, FILM COATED ORAL 2 TIMES DAILY
Qty: 60 TABLET | Refills: 2 | OUTPATIENT
Start: 2025-06-09

## 2025-07-02 ENCOUNTER — CLINICAL DOCUMENTATION (OUTPATIENT)
Age: 70
End: 2025-07-02

## 2025-07-02 NOTE — PROGRESS NOTES
Received fax from [facility], Nurse Norris, requesting recent Hemoglobin A1C results.  Faxed to [phone].

## 2025-07-11 ENCOUNTER — OFFICE VISIT (OUTPATIENT)
Age: 70
End: 2025-07-11
Payer: MEDICARE

## 2025-07-11 VITALS — OXYGEN SATURATION: 96 % | BODY MASS INDEX: 30.22 KG/M2 | TEMPERATURE: 97.3 F | HEIGHT: 67 IN | HEART RATE: 88 BPM

## 2025-07-11 DIAGNOSIS — G89.4 CHRONIC PAIN SYNDROME: Primary | ICD-10-CM

## 2025-07-11 DIAGNOSIS — R53.81 PHYSICAL DECONDITIONING: ICD-10-CM

## 2025-07-11 DIAGNOSIS — G89.29 CHRONIC BILATERAL LOW BACK PAIN WITHOUT SCIATICA: ICD-10-CM

## 2025-07-11 DIAGNOSIS — M54.50 CHRONIC BILATERAL LOW BACK PAIN WITHOUT SCIATICA: ICD-10-CM

## 2025-07-11 PROCEDURE — 99213 OFFICE O/P EST LOW 20 MIN: CPT | Performed by: STUDENT IN AN ORGANIZED HEALTH CARE EDUCATION/TRAINING PROGRAM

## 2025-07-11 RX ORDER — LIDOCAINE 50 MG/G
1 PATCH TOPICAL DAILY
Qty: 90 PATCH | Refills: 1 | Status: SHIPPED | OUTPATIENT
Start: 2025-07-11

## 2025-07-11 RX ORDER — DULOXETIN HYDROCHLORIDE 30 MG/1
30 CAPSULE, DELAYED RELEASE ORAL DAILY
Qty: 30 CAPSULE | Refills: 0 | Status: SHIPPED | OUTPATIENT
Start: 2025-07-11

## 2025-07-11 NOTE — PROGRESS NOTES
[facility] Note      History of Present Illness:     Chief Complaint   Patient presents with    Leg Pain     Bilateral leg pain, right calf pain yesterday, right arm pain.       Harry Bahena is a 69 y.o. male with a PMH notable for mood disorder, immobility, deconditioning, chronic pain, T2DM, HTN, HLD, CKD who presents for followup.    #generalized pain - feels \"excruciating pain, more than normal\" in the last few weeks, both legs, both arms, previously L>R but now equal. Upper Left and lower right. Describes as pulsating. Has suffered from chronic pain for a long time, extensive workup has been unrevealing. He has become essentially bed bound due to pain.      PMH (REVIEWED):  Past Medical History:   Diagnosis Date    Anxiety     Arthritis     Asthma     Burning with urination     Chronic back pain     Depression     Diabetes (HCC)     GERD (gastroesophageal reflux disease)     Hearing loss graciela    tika    Ill-defined condition     lymphoma  (microscopic)    Memory loss     Psychiatric diagnosis     Generalized Anxiety Disorder    Psychiatric disorder     Anxiety    Psychiatric disorder     Depression    Restless legs syndrome graciela    and self    Seizures (HCC) tika    Sleep apnea     Type 2 diabetes mellitus without complication (HCC)     Urinary incontinence        Current Medications/Allergies (REVIEWED):     Current Outpatient Medications on File Prior to Visit   Medication Sig Dispense Refill    famotidine (PEPCID) 20 MG tablet Take 1 tablet by mouth 2 times daily 180 tablet 0    levoFLOXacin (LEVAQUIN) 500 MG tablet Take 1 tablet by mouth daily Indications: Chest Congestion Per pt taking for congestion      ibuprofen (ADVIL;MOTRIN) 600 MG tablet Take 1 tablet by mouth 3 times daily as needed for Pain 30 tablet 0    albuterol sulfate HFA (PROVENTIL;VENTOLIN;PROAIR) 108 (90 Base) MCG/ACT inhaler USE 2 INHALATIONS FOUR TIMES A DAY 25.5 g 3    losartan (COZAAR) 25 MG tablet

## 2025-07-14 ENCOUNTER — CLINICAL DOCUMENTATION (OUTPATIENT)
Age: 70
End: 2025-07-14

## 2025-07-17 ENCOUNTER — TELEPHONE (OUTPATIENT)
Age: 70
End: 2025-07-17

## 2025-07-17 NOTE — TELEPHONE ENCOUNTER
[facility] pharmacist Suzanne pleading for anyone to contact them to give a quick verbal order that says they can refill the patient's FREESTYLE HIWOT 2 SENSOR with the latest model out today, this the FREESTYLE HIWOT 2 PLUS SENSOR. Please call [phone], hit star 8 at the prompt to get to pharmacist. Patient needs this refill right now.    [id number] [place], [place] - [address] [place] [address] Drive - P [phone] - F [phone]

## 2025-07-18 NOTE — TELEPHONE ENCOUNTER
Hi Dr. Guzman,    The pharmacy is calling you back they need to speak with you directly. Please give them a call at [phone].

## 2025-07-30 ENCOUNTER — TELEPHONE (OUTPATIENT)
Age: 70
End: 2025-07-30

## 2025-07-31 ENCOUNTER — TELEPHONE (OUTPATIENT)
Age: 70
End: 2025-07-31

## 2025-07-31 NOTE — TELEPHONE ENCOUNTER
Received a form from [employer] requesting an indication/diagnosis for the Lidocaine Patch, faxed signed paperwork to [phone] on 7/29/25. Sent to scanning.

## 2025-08-05 ENCOUNTER — TELEPHONE (OUTPATIENT)
Age: 70
End: 2025-08-05

## 2025-08-08 ENCOUNTER — CLINICAL DOCUMENTATION (OUTPATIENT)
Age: 70
End: 2025-08-08

## 2025-09-03 ENCOUNTER — OFFICE VISIT (OUTPATIENT)
Age: 70
End: 2025-09-03
Payer: MEDICARE

## 2025-09-03 VITALS
DIASTOLIC BLOOD PRESSURE: 84 MMHG | BODY MASS INDEX: 29.03 KG/M2 | WEIGHT: 185 LBS | TEMPERATURE: 98.2 F | HEART RATE: 104 BPM | SYSTOLIC BLOOD PRESSURE: 118 MMHG | HEIGHT: 67 IN | OXYGEN SATURATION: 96 %

## 2025-09-03 DIAGNOSIS — G89.29 CHRONIC NEUROPATHIC PAIN: Primary | ICD-10-CM

## 2025-09-03 DIAGNOSIS — G89.29 CHRONIC BILATERAL LOW BACK PAIN WITHOUT SCIATICA: ICD-10-CM

## 2025-09-03 DIAGNOSIS — M79.2 CHRONIC NEUROPATHIC PAIN: Primary | ICD-10-CM

## 2025-09-03 DIAGNOSIS — M54.50 CHRONIC BILATERAL LOW BACK PAIN WITHOUT SCIATICA: ICD-10-CM

## 2025-09-03 DIAGNOSIS — G89.4 CHRONIC PAIN SYNDROME: ICD-10-CM

## 2025-09-03 PROCEDURE — 3079F DIAST BP 80-89 MM HG: CPT | Performed by: STUDENT IN AN ORGANIZED HEALTH CARE EDUCATION/TRAINING PROGRAM

## 2025-09-03 PROCEDURE — 1036F TOBACCO NON-USER: CPT | Performed by: STUDENT IN AN ORGANIZED HEALTH CARE EDUCATION/TRAINING PROGRAM

## 2025-09-03 PROCEDURE — G8417 CALC BMI ABV UP PARAM F/U: HCPCS | Performed by: STUDENT IN AN ORGANIZED HEALTH CARE EDUCATION/TRAINING PROGRAM

## 2025-09-03 PROCEDURE — 1159F MED LIST DOCD IN RCRD: CPT | Performed by: STUDENT IN AN ORGANIZED HEALTH CARE EDUCATION/TRAINING PROGRAM

## 2025-09-03 PROCEDURE — 99213 OFFICE O/P EST LOW 20 MIN: CPT | Performed by: STUDENT IN AN ORGANIZED HEALTH CARE EDUCATION/TRAINING PROGRAM

## 2025-09-03 PROCEDURE — 3017F COLORECTAL CA SCREEN DOC REV: CPT | Performed by: STUDENT IN AN ORGANIZED HEALTH CARE EDUCATION/TRAINING PROGRAM

## 2025-09-03 PROCEDURE — 3074F SYST BP LT 130 MM HG: CPT | Performed by: STUDENT IN AN ORGANIZED HEALTH CARE EDUCATION/TRAINING PROGRAM

## 2025-09-03 PROCEDURE — 1123F ACP DISCUSS/DSCN MKR DOCD: CPT | Performed by: STUDENT IN AN ORGANIZED HEALTH CARE EDUCATION/TRAINING PROGRAM

## 2025-09-03 PROCEDURE — G8427 DOCREV CUR MEDS BY ELIG CLIN: HCPCS | Performed by: STUDENT IN AN ORGANIZED HEALTH CARE EDUCATION/TRAINING PROGRAM

## 2025-09-03 RX ORDER — PREDNISONE 10 MG/1
TABLET ORAL
COMMUNITY

## 2025-09-03 RX ORDER — DULOXETIN HYDROCHLORIDE 30 MG/1
30 CAPSULE, DELAYED RELEASE ORAL DAILY
Qty: 90 CAPSULE | Refills: 3 | Status: SHIPPED | OUTPATIENT
Start: 2025-09-03

## 2025-09-03 ASSESSMENT — PATIENT HEALTH QUESTIONNAIRE - PHQ9: DEPRESSION UNABLE TO ASSESS: PT REFUSES

## 2025-09-04 ENCOUNTER — TELEPHONE (OUTPATIENT)
Age: 70
End: 2025-09-04

## (undated) DEVICE — 3000CC GUARDIAN II: Brand: GUARDIAN

## (undated) DEVICE — INFECTION CONTROL KIT SYS

## (undated) DEVICE — SURGICAL PROCEDURE PACK BASIN MAJ SET CUST NO CAUT

## (undated) DEVICE — SLIM BODY SKIN STAPLER: Brand: APPOSE ULC

## (undated) DEVICE — DRAPE XR C ARM 41X74IN LF --

## (undated) DEVICE — COVER LT HNDL BLU PLAS

## (undated) DEVICE — SUTURE MCRYL SZ 2-0 L27IN ABSRB UD SH L26MM TAPERPOINT NDL Y417H

## (undated) DEVICE — SUPER SPONGES,MEDIUM: Brand: DERMACEA

## (undated) DEVICE — STERILE POLYISOPRENE POWDER-FREE SURGICAL GLOVES: Brand: PROTEXIS

## (undated) DEVICE — DRAPE,EXTREMITY,89X128,STERILE: Brand: MEDLINE

## (undated) DEVICE — (D)PREP SKN CHLRAPRP APPL 26ML -- CONVERT TO ITEM 371833

## (undated) DEVICE — GOWN,SIRUS,NONRNF,SETINSLV,2XL,18/CS: Brand: MEDLINE

## (undated) DEVICE — DEVON™ KNEE AND BODY STRAP 60" X 3" (1.5 M X 7.6 CM): Brand: DEVON

## (undated) DEVICE — REM POLYHESIVE ADULT PATIENT RETURN ELECTRODE: Brand: VALLEYLAB

## (undated) DEVICE — ROCKER SWITCH PENCIL BLADE ELECTRODE, HOLSTER: Brand: EDGE

## (undated) DEVICE — CUSTOM CAST PD STR

## (undated) DEVICE — K WIRE FIX L150MM DIA1.25MM S STL TRCR PNT

## (undated) DEVICE — DRAPE,REIN 53X77,STERILE: Brand: MEDLINE

## (undated) DEVICE — HOOK LOCK LATEX FREE ELASTIC BANDAGE 6INX5YD

## (undated) DEVICE — SOLUTION IV 1000ML 0.9% SOD CHL

## (undated) DEVICE — BASIC PACK: Brand: CONVERTORS

## (undated) DEVICE — 2.5MM DRILL BIT/QC/GOLD/110MM

## (undated) DEVICE — MESH GAUZE FINE ROLL 1 PLY: Brand: DEROYAL

## (undated) DEVICE — INTENDED FOR TISSUE SEPARATION, AND OTHER PROCEDURES THAT REQUIRE A SHARP SURGICAL BLADE TO PUNCTURE OR CUT.: Brand: BARD-PARKER ® CARBON RIB-BACK BLADES

## (undated) DEVICE — Device

## (undated) DEVICE — SYRINGE BLB 50CC IRRIG PLIABLE FNGR FLNG GRAD FLSK DISP

## (undated) DEVICE — TOWEL SURG W17XL27IN STD BLU COT NONFENESTRATED PREWASHED

## (undated) DEVICE — BIT DRL L110MM DIA3.5MM QUIK CPL W/O STP REUSE

## (undated) DEVICE — ARGYLE FRAZIER SURGICAL SUCTION INSTRUMENT 10 FR/CH (3.3 MM): Brand: ARGYLE

## (undated) DEVICE — SUTURE MCRYL SZ 3-0 L27IN ABSRB UD L26MM SH 1/2 CIR Y416H